# Patient Record
Sex: FEMALE | Race: BLACK OR AFRICAN AMERICAN | NOT HISPANIC OR LATINO | URBAN - METROPOLITAN AREA
[De-identification: names, ages, dates, MRNs, and addresses within clinical notes are randomized per-mention and may not be internally consistent; named-entity substitution may affect disease eponyms.]

---

## 2018-02-07 ENCOUNTER — OFFICE VISIT (OUTPATIENT)
Dept: RHEUMATOLOGY | Facility: CLINIC | Age: 78
End: 2018-02-07
Payer: MEDICARE

## 2018-02-07 ENCOUNTER — HOSPITAL ENCOUNTER (OUTPATIENT)
Dept: RADIOLOGY | Facility: HOSPITAL | Age: 78
Discharge: HOME | End: 2018-02-07
Attending: INTERNAL MEDICINE
Payer: MEDICARE

## 2018-02-07 VITALS
WEIGHT: 206.25 LBS | HEART RATE: 66 BPM | DIASTOLIC BLOOD PRESSURE: 76 MMHG | HEIGHT: 64 IN | SYSTOLIC BLOOD PRESSURE: 149 MMHG | BODY MASS INDEX: 35.21 KG/M2

## 2018-02-07 DIAGNOSIS — M19.90 OSTEOARTHRITIS, UNSPECIFIED OSTEOARTHRITIS TYPE, UNSPECIFIED SITE: ICD-10-CM

## 2018-02-07 DIAGNOSIS — B18.2 HEPATITIS C CARRIER: ICD-10-CM

## 2018-02-07 DIAGNOSIS — M79.672 FOOT PAIN, LEFT: ICD-10-CM

## 2018-02-07 DIAGNOSIS — M10.9 GOUTY ARTHRITIS: ICD-10-CM

## 2018-02-07 DIAGNOSIS — L40.50 PSA (PSORIATIC ARTHRITIS): Primary | ICD-10-CM

## 2018-02-07 DIAGNOSIS — L40.50 PSA (PSORIATIC ARTHRITIS): ICD-10-CM

## 2018-02-07 PROCEDURE — 96372 THER/PROPH/DIAG INJ SC/IM: CPT | Mod: PBBFAC,PO

## 2018-02-07 PROCEDURE — 1125F AMNT PAIN NOTED PAIN PRSNT: CPT | Mod: ,,, | Performed by: INTERNAL MEDICINE

## 2018-02-07 PROCEDURE — 73620 X-RAY EXAM OF FOOT: CPT | Mod: 26,59,LT, | Performed by: RADIOLOGY

## 2018-02-07 PROCEDURE — 99205 OFFICE O/P NEW HI 60 MIN: CPT | Mod: 25,S$PBB,, | Performed by: INTERNAL MEDICINE

## 2018-02-07 PROCEDURE — 72040 X-RAY EXAM NECK SPINE 2-3 VW: CPT | Mod: TC,FY,PO

## 2018-02-07 PROCEDURE — 99204 OFFICE O/P NEW MOD 45 MIN: CPT | Mod: PBBFAC,25,PO | Performed by: INTERNAL MEDICINE

## 2018-02-07 PROCEDURE — 1159F MED LIST DOCD IN RCRD: CPT | Mod: ,,, | Performed by: INTERNAL MEDICINE

## 2018-02-07 PROCEDURE — 73620 X-RAY EXAM OF FOOT: CPT | Mod: 26,RT,, | Performed by: RADIOLOGY

## 2018-02-07 PROCEDURE — 99999 PR PBB SHADOW E&M-NEW PATIENT-LVL IV: CPT | Mod: PBBFAC,,, | Performed by: INTERNAL MEDICINE

## 2018-02-07 PROCEDURE — 72040 X-RAY EXAM NECK SPINE 2-3 VW: CPT | Mod: 26,,, | Performed by: RADIOLOGY

## 2018-02-07 PROCEDURE — 73620 X-RAY EXAM OF FOOT: CPT | Mod: TC,50,FY,PO,LT

## 2018-02-07 RX ORDER — CLOPIDOGREL BISULFATE 75 MG/1
75 TABLET ORAL DAILY
COMMUNITY

## 2018-02-07 RX ORDER — FUROSEMIDE 20 MG/1
20 TABLET ORAL 2 TIMES DAILY
COMMUNITY

## 2018-02-07 RX ORDER — ACETAMINOPHEN AND CODEINE PHOSPHATE 300; 30 MG/1; MG/1
1 TABLET ORAL 3 TIMES DAILY PRN
Qty: 90 TABLET | Refills: 1 | Status: SHIPPED | OUTPATIENT
Start: 2018-02-07 | End: 2018-03-09

## 2018-02-07 RX ORDER — LEVOTHYROXINE SODIUM 25 UG/1
25 TABLET ORAL
COMMUNITY

## 2018-02-07 RX ORDER — ASPIRIN 325 MG
325 TABLET, DELAYED RELEASE (ENTERIC COATED) ORAL DAILY
COMMUNITY

## 2018-02-07 RX ORDER — PREDNISONE 2.5 MG/1
2.5 TABLET ORAL DAILY
Qty: 30 TABLET | Refills: 5 | Status: SHIPPED | OUTPATIENT
Start: 2018-02-07 | End: 2018-03-09

## 2018-02-07 RX ORDER — ATORVASTATIN CALCIUM 40 MG/1
40 TABLET, FILM COATED ORAL NIGHTLY
COMMUNITY

## 2018-02-07 RX ORDER — KETOROLAC TROMETHAMINE 30 MG/ML
30 INJECTION, SOLUTION INTRAMUSCULAR; INTRAVENOUS
Status: COMPLETED | OUTPATIENT
Start: 2018-02-07 | End: 2018-02-07

## 2018-02-07 RX ORDER — ALLOPURINOL 300 MG/1
300 TABLET ORAL DAILY
COMMUNITY

## 2018-02-07 RX ORDER — DULOXETIN HYDROCHLORIDE 30 MG/1
30 CAPSULE, DELAYED RELEASE ORAL DAILY
COMMUNITY
End: 2018-09-04 | Stop reason: SDUPTHER

## 2018-02-07 RX ORDER — METOPROLOL TARTRATE 25 MG/1
25 TABLET, FILM COATED ORAL 2 TIMES DAILY
COMMUNITY

## 2018-02-07 RX ORDER — GABAPENTIN 400 MG/1
400 CAPSULE ORAL NIGHTLY
COMMUNITY

## 2018-02-07 RX ORDER — CALCIUM CARBONATE 600 MG
600 TABLET ORAL ONCE
COMMUNITY

## 2018-02-07 RX ORDER — CALCITRIOL 0.5 UG/1
0.5 CAPSULE ORAL DAILY
COMMUNITY

## 2018-02-07 RX ORDER — METHYLPREDNISOLONE ACETATE 80 MG/ML
80 INJECTION, SUSPENSION INTRA-ARTICULAR; INTRALESIONAL; INTRAMUSCULAR; SOFT TISSUE
Status: COMPLETED | OUTPATIENT
Start: 2018-02-07 | End: 2018-02-07

## 2018-02-07 RX ADMIN — KETOROLAC TROMETHAMINE 30 MG: 30 INJECTION, SOLUTION INTRAMUSCULAR; INTRAVENOUS at 04:02

## 2018-02-07 RX ADMIN — METHYLPREDNISOLONE ACETATE 80 MG: 80 INJECTION, SUSPENSION INTRA-ARTICULAR; INTRALESIONAL; INTRAMUSCULAR; SOFT TISSUE at 04:02

## 2018-02-07 NOTE — PROGRESS NOTES
Subjective:       Patient ID: Priscilla Landry is a 77 y.o. female.    Chief Complaint: Consult    HPI pt was hospitalized and put in a nursing home x 2 years and had L toes amputated now at home with arthritis, curently on Humira with minimal relief        Disease Management HPI      Psoriatic Arthritis    The disease course has been worsening.     She complains of pain, stiffness, joint swelling and joint warmth. Onset of symptoms was 15 years ago.The symptoms have been worsening. Affected locations include the neck, right shoulder, right elbow, right wrist, right MCP, right PIP, right DIP, right hip, right toes, right foot, right ankle, right knee, left shoulder, left elbow, left wrist, left MCP, left PIP, left DIP, left hip, left knee, left ankle and left foot. Associated symptoms include fatigue, fever, pain at night, pain while resting, myalgias, dry eyes and stress. Pertinent negatives include no dysuria, Raynaud's syndrome, uveitis, trouble swallowing, pleurisy, headaches or jaw pain. She complains of morning stiffness lasting between 30 and 60 minutes after awakening.The neck, left shoulder, right shoulder, left elbow, right elbow, left wrist, right hand, left hand, right wrist, left hip, right hip, right knee, left ankle, right ankle and left knee presents with Arthralgia.    Past treatments include methotrexate, NSAIDs and corticosteroids (Humira, remicade). The treatment provided moderate relief. Factors aggravating her arthritis include activity. Past compliance problems include medical issues.    Review of Systems   Constitutional: Positive for fatigue and fever. Negative for activity change, appetite change, chills, diaphoresis and unexpected weight change.   HENT: Negative for congestion, dental problem, ear discharge, ear pain, facial swelling, mouth sores, nosebleeds, postnasal drip, rhinorrhea, sinus pressure, sneezing, sore throat, tinnitus, trouble swallowing and voice change.    Eyes: Negative  "for photophobia, pain, discharge, redness and itching.   Respiratory: Negative for apnea, cough, chest tightness, shortness of breath and wheezing.    Cardiovascular: Positive for leg swelling. Negative for chest pain and palpitations.   Gastrointestinal: Negative for abdominal distention, abdominal pain, constipation, diarrhea, nausea and vomiting.   Endocrine: Negative for cold intolerance, heat intolerance, polydipsia and polyuria.   Genitourinary: Negative for decreased urine volume, difficulty urinating, dysuria, flank pain, frequency, hematuria and urgency.   Musculoskeletal: Positive for arthralgias, back pain, gait problem, joint swelling, myalgias, neck pain, neck stiffness and stiffness.   Skin: Negative for pallor, rash and wound.   Allergic/Immunologic: Negative for immunocompromised state.   Neurological: Negative for dizziness, tremors, weakness, numbness and headaches.   Hematological: Negative for adenopathy. Does not bruise/bleed easily.   Psychiatric/Behavioral: Negative for sleep disturbance. The patient is not nervous/anxious.          Objective:   BP (!) 149/76   Pulse 66   Ht 5' 4" (1.626 m)   Wt 93.6 kg (206 lb 3.9 oz)   BMI 35.40 kg/m²      Physical Exam   Vitals reviewed.  Constitutional: She is oriented to person, place, and time and well-developed, well-nourished, and in no distress.   HENT:   Head: Normocephalic and atraumatic.   Mouth/Throat: Oropharynx is clear and moist.   Eyes: EOM are normal. Pupils are equal, round, and reactive to light.   Neck: Neck supple. No thyromegaly present.   Cardiovascular: Normal rate, regular rhythm and normal heart sounds.  Exam reveals no gallop and no friction rub.    No murmur heard.  Pulmonary/Chest: Breath sounds normal. She has no wheezes. She has no rales. She exhibits no tenderness.   Abdominal: There is no tenderness. There is no rebound and no guarding.       Right Side Rheumatological Exam     Examination finds the elbow, 2nd MCP, 3rd " MCP, 4th MCP and 5th MCP normal.    The patient is tender to palpation of the shoulder and knee    She has swelling of the knee, 2nd PIP, 3rd PIP, 4th PIP and 5th PIP    The patient has an enlarged wrist, 1st PIP, 2nd PIP, 3rd PIP, 4th PIP and 5th PIP    Shoulder Exam   Tenderness Location: acromion    Range of Motion   Active Abduction: abnormal   Adduction: abnormal  Sensation: normal    Knee Exam   Tenderness Location: medial joint line  Patellofemoral Crepitus: positive  Effusion: positive  Sensation: normal    Hip Exam   Tenderness Location: no tenderness  Sensation: normal    Elbow/Wrist Exam   Tenderness Location: no tenderness  Sensation: normal    Left Side Rheumatological Exam     Examination finds the elbow, knee, 1st MCP, 2nd MCP, 3rd MCP, 4th MCP and 5th MCP normal.    The patient is tender to palpation of the shoulder.    She has swelling of the 1st PIP, 2nd PIP, 3rd PIP, 4th PIP and 5th PIP    The patient has an enlarged wrist, 1st PIP, 2nd PIP, 3rd PIP, 4th PIP and 5th PIP.    Shoulder Exam   Tenderness Location: acromion    Range of Motion   Active Abduction: abnormal   Sensation: normal    Knee Exam   Tenderness Location: lateral joint line and medial joint line    Patellofemoral Crepitus: positive  Effusion: positive  Sensation: normal    Hip Exam   Tenderness Location: no tenderness  Sensation: normal    Elbow/Wrist Exam   Sensation: normal      Back/Neck Exam   General Inspection   Gait: normal       Tenderness Right paramedian tenderness of the Lower C-Spine and Lower L-Spine.Left paramedian tenderness of the Upper C-Spine and Lower L-Spine.      Lymphadenopathy:     She has no cervical adenopathy.   Neurological: She is alert and oriented to person, place, and time. She has normal sensation and normal strength. Gait normal.   Reflex Scores:       Patellar reflexes are 3+ on the right side and 3+ on the left side.  Skin: No rash noted. No erythema. No pallor.     Psoriasis B elbows, knees, back   feet   Psychiatric: Mood and affect normal.   Musculoskeletal: She exhibits edema, tenderness and deformity.           Urine, Microscopic (01/04/2018 11:53 AM)  Urine, Microscopic (01/04/2018 11:53 AM)   Component Value Ref Range   WBCs 0-5 0 - 5 /HPF   RBCs 6-10 (A) 0 - 2 /HPF   Bacteria Rare (A) None Seen /HPF   Squamous Epithelial Cells   (A) 0 - 20 /LPF   Mucus Rare (A) None Seen /LPF     Urine, Microscopic (01/04/2018 11:53 AM)   Specimen Performing Laboratory   Urine [facility]    [address] [place] [address]     Back to top of Lab Results      Urinalysis, Microscopic if Indicated (01/04/2018 11:53 AM)  Urinalysis, Microscopic if Indicated (01/04/2018 11:53 AM)   Component Value Ref Range   Color Yellow Yellow   Clarity/Appearance Clear Clear   Specific Gravity 1.018 1.005 - 1.030   pH 5.0 5.0 - 8.0   Glucose, UA Normal Normal mg/dL   Protein 30 mg/dL (A) Negative   Ketones Negative Negative   Bilirubin, Urine Negative Negative   Urobilinogen, UA Normal Normal   Nitrites Negative Negative   Blood Negative Negative   Leukocyte Esterase, UA Negative Negative     Urinalysis, Microscopic if Indicated (01/04/2018 11:53 AM)   Specimen Performing Laboratory   Urine [facility]    [address] [place] [id number]     Back to top of Lab Results      Phosphorus (01/04/2018 11:48 AM)  Phosphorus (01/04/2018 11:48 AM)   Component Value Ref Range   Phosphorus 3.9 2.5 - 4.7 mg/dL     Phosphorus (01/04/2018 11:48 AM)   Specimen Performing Laboratory   Blood [facility]    [address] [place] [address]     Back to top of Lab Results      CBC with Differential (01/04/2018 11:48 AM)  CBC with Differential (01/04/2018 11:48 AM)   Component Value Ref Range   WBC 6.2 4.5 - 11.0 10   RBC 3.79 (L) 4.00 - 5.20 10   Hemoglobin 11.8 (L) 12.0 - 16.0 g/dL   Hematocrit 36.5 35.0 - 46.0 %   MCV 96.3 80.0 - 100.0 fL   MCH 31.1 26.0 - 34.0 pg   MCHC 32.3 31.0 - 37.0 g/dL   RDW 13.9 11.5 -  14.5 %   Platelet Count 154 130 - 400 10   MPV 9.3 7.4 - 10.4 fL   Neutrophils Absolute - Instrument 3.6 1.8 - 8.0 10   Lymphocytes Absolute - Instrument 1.9 1.1 - 5.0 10   Monocytes Absolute - Instrument 0.4 0.2 - 1.1 10   Eosinophils Absolute - Instrument 0.3 0.0 - 0.6 10   Basophils Absolute - Instrument 0.1 0.0 - 0.2 10   Neutrophils Percent - Instrument 58.1 40 - 75 %   Lymphocytes Percent - Instrument 30.0 18 - 46 %   Monocytes Percent - Instrument 6.9 2 - 10 %   Eosinophils Percent - Instrument 4.1 0 - 6 %   Basophils Percent - Instrument 0.9 0 - 2 %     CBC with Differential (01/04/2018 11:48 AM)   Specimen Performing Laboratory   Blood [facility]    [address] [place] [address]     Back to top of Lab Results      Hepatitis C RNA Quantitative PCR (01/04/2018 11:48 AM)  Hepatitis C RNA Quantitative PCR (01/04/2018 11:48 AM)   Component Value Ref Range   Hepatitis C RNA By PCR Comment: HCV RNA Not Detected. IU/mL     Hepatitis C RNA Quantitative PCR (01/04/2018 11:48 AM)   Specimen Performing Laboratory   Blood [facility]    [address] [place] [address]       Hepatitis C RNA Quantitative PCR (01/04/2018 11:48 AM)   Narrative       Roche Ning Ampliprep/Taqman HCV Test, v2.0     Back to top of Lab Results      Hemoglobin A1c (01/04/2018 11:48 AM)  Hemoglobin A1c (01/04/2018 11:48 AM)   Component Value Ref Range   % Hemoglobin A1c 6.1 (H) 4.7 - 5.6 %   Estimated Average Glucose 128 (H) <115 mg/dL     Hemoglobin A1c (01/04/2018 11:48 AM)   Specimen Performing Laboratory   Blood [facility]    [address] [address] [address]       Hemoglobin A1c (01/04/2018 11:48 AM)   Narrative   Hemoglobin A1c Interpretative Guide    Hemoglobin A1c Result Interpretation    4.7%-5.6%Normal Reference Range    5.7%-6.4%Increased Risk for Diabetes    >6.4%     Diagnostic of Diabetes    <7.0%     Adult Glycemic Control Target         Back to top of Lab Results      CBC with Differential  (01/04/2018 11:48 AM)  CBC with Differential (01/04/2018 11:48 AM)   Specimen Performing Laboratory   Blood        CBC with Differential (01/04/2018 11:48 AM)   Narrative   The following orders were created for panel order CBC with Differential.    Procedure                               Abnormality         Status                       ---------                               -----------         ------                       CBC with Differential[[id number]]         Abnormal            Final result                     Please view results for these tests on the individual orders.     Back to top of Lab Results      TSH with Reflex (01/04/2018 11:48 AM)  TSH with Reflex (01/04/2018 11:48 AM)   Component Value Ref Range   TSH 4.09 0.50 - 5.00 uIU/mL     TSH with Reflex (01/04/2018 11:48 AM)   Specimen Performing Laboratory   Blood [facility]    [address] [address] [address]     Back to top of Lab Results      Lipid Panel (01/04/2018 11:48 AM)  Lipid Panel (01/04/2018 11:48 AM)   Component Value Ref Range   Cholesterol 167 <200 mg/dL   HDL Cholesterol 50 40 - 59 mg/dL   LDL Cholesterol Calculated 96 <130 mg/dL   Triglycerides 104 <150 mg/dL   Non-HDL Calculated 117     Chol/HDL Ratio 3.34 0.00 - 4.40     Lipid Panel (01/04/2018 11:48 AM)   Specimen Performing Laboratory   Blood [facility]    [address] [address] [address]       Lipid Panel (01/04/2018 11:48 AM)   Narrative   CHOL/HDL RATIO    CHD RISK:        Male     Female        Average Risk     5.0      4.4    2x Average Risk  9.6      7.1    3x Average Risk  23.4     11.0         Back to top of Lab Results      Comprehensive Metabolic Panel (01/04/2018 11:48 AM)  Comprehensive Metabolic Panel (01/04/2018 11:48 AM)   Component Value Ref Range   Sodium 141 135 - 146 mmol/L   Potassium 4.4 3.6 - 5.2 mmol/L   Chloride 102 96 - 110 mmol/L   Carbon Dioxide 30 24 - 32 mmol/L   Glucose 102 (H) 65 - 99 mg/dL   BUN 32 (H) 7 - 25 mg/dL   Creatinine  1.52 (H) 0.50 - 1.10 mg/dL   Calcium 10.1 8.4 - 10.3 mg/dL   Total Protein 9.3 (H) 6.0 - 8.0 g/dL   Albumin 4.6 3.4 - 5.0 g/dL   AST 40 <45 U/L   ALT 28 <46 U/L   Alkaline Phosphatase 74 20 - 120 U/L   Bilirubin, Total 0.8 <1.3 mg/dL   EGFR,  38 (L) >89 mL/min   EGFR, Non  32 (L) >89 mL/min     Comprehensive Metabolic Panel (2018 11:48 AM)   Specimen Performing Laboratory   Blood [facility]    [address] [address] [address]     Back to top of Lab Results  Component Name Value Ref Range   Total Bilirubin 0.5 0.0 - 1.2 mg/dL   ALT (SGPT) P5P 30 0 - 40 IU/L    Haptoglobin 134 34 - 200 mg/dL   GGT 20 0 - 60 IU/L    Apolipoprotein A-1 136 116 - 209 mg/dL   Alpha 2-Macroglobulins, Qn 277 (H) 110 - 276 mg/dL   Fibrosis Scorin.45 (H) 0.00 - 0.21    Necroinflammat Activity Score 0.18 (H) 0.00 - 0.17    Limitations: Comment   Comment:  (NOTE)  The negative predictive value of a Fibrotest score <0.31 (absence of  clinically significant fibrosis) was 85% when compared to liver  biopsy in 1,270 HCV infected patients with a 38% prevalence of  significant liver fibrosis (F2, 3 or 4). The positive predictive  value of a Fibro-test score >0.48 (F2, 3, 4) was 61% in that same  patient cohort. HCV FibroSURE is not recommended in patients with  Gilbert Disease, acute hemolysis (e.g. HCV ribavirin therapy mediated  hemolysis) acute hepa-titis of the liver, extra-hepatic cholestasis,  transplant patients, and/or renal insufficiency patients.  Any of  these clinical situations may lead to inaccurate quantitative  predictions of fibrosis and necroinflammatory activity in the liver.     Interpretations: Comment   Comment:  (NOTE)  Quantitative results of 6 biochemical tests are analyzed using  a computational algorithm to provide a quantitative surrogate  marker (0.0-1.0) for liver fibrosis (METAVIR F0-F4) and for  necroinflammatory activity (METAVIR A0-A3).     Fibrosis Scoring:  Comment   Comment:  (NOTE)       <0.21 = Stage F0 - No fibrosis  0.21 - 0.27 = Stage F0 - F1  0.27 - 0.31 = Stage F1 - Portal fibrosis  0.31 - 0.48 = Stage F1 - F2  0.48 - 0.58 = Stage F2 - Bridging fibrosis with few septa  0.58 - 0.72 = Stage F3 - Bridging fibrosis with many septa  0.72 - 0.74 = Stage F3 - F4       >0.74 = Stage F4 - Cirrhosis     Necroinflamm Activity Scoring: Comment   Comment:  (NOTE)       <0.17 = Grade A0 - No Activity  0.17 - 0.29 = Grade A0 - A1  0.29 - 0.36 = Grade A1 - Minimal activity  0.36 - 0.52 = Grade A1 - A2  0.52 - 0.60 = Grade A2 - Moderate activity  0.60 - 0.62 = Grade A2 - A3       >0.62 = Grade A3 - Severe activity     Fibrosis Stage F1*F2     Necroinflammat Activity Grade A0*A1   Comment: PERFORMED AT: [address] [address], PH: [phone], DIRECTOR: WANDY CARROLL MD     Specimen   Blood     Component Name Value Ref Range   Hepatitis C RNA By PCR     Comment: HCV RNA Not Detected. IU/mL    Specimen   Blood       QuantiFERON In Tube (11/06/2017 1:45 PM)  QuantiFERON In Tube (11/06/2017 1:45 PM)   Component Value Ref Range   QuantiFERON TB Gold - [facility] Negative Negative   QuantiFERON Criteria - [facility] Comment  Comment:   To be considered positive a specimen should have a TB Ag minus Nil  value greater than or equal to 0.35 IU/mL and in addition the TB Ag  minus Nil value must be greater than or equal to 25% of the Nil  value. There may be insufficient information in these values to  differentiate between some negative and some indeterminate test  values.     QuantiFERON TB Ag Value - [facility] 0.11 IU/mL   QuantiFERON Nil Value - [facility] 0.11 IU/mL   QuantiFERON Mitogen Value - [facility] 5.03 IU/mL   QFT TB Ag minus Nil Value - [facility] 0.00 IU/mL   Interpretation: - [facility] Comment  Comment:   The QuantiFERON TB Gold (in Tube) assay is intended for use as an aid  in the diagnosis of TB infection. Negative results suggest that  there  is no TB infection. In patients with high suspicion of exposure, a  negative test should be repeated. A positive test indicates infection  with Mycobacterium tuberculosis. Among individuals without  tuberculosis infection, a positive test may be due to exposure to  M. kansasii, M. szulgai or M. marinum. On the Internet, go to  cdc.gov/tb for further details.       QuantiFERON In Tube (11/06/2017 1:45 PM)   Specimen Performing Laboratory   Blood [facility]       QuantiFERON In Tube (11/06/2017 1:45 PM)   Narrative   Performed at:  [address] [place] [address]       his encounter    Assessment:       1. PSA (psoriatic arthritis)    2. Osteoarthritis, unspecified osteoarthritis type, unspecified site    3. Hepatitis C carrier    4. Foot pain, left    5. Gouty arthritis            Plan:        Priscilla was seen today for consult.    Diagnoses and all orders for this visit:    PSA (psoriatic arthritis)  Comments:  consider enbrel, or stelara  Orders:  -     Hepatitis C antibody; Future  -     Hepatitis C RNA, quantitative, PCR; Future  -     Hepatitis C Viral RNA Genotype, LIPA; Future  -     CBC auto differential; Future  -     Comprehensive metabolic panel; Future  -     Sedimentation rate, manual; Future  -     C-reactive protein; Future  -     predniSONE (DELTASONE) 2.5 MG tablet; Take 1 tablet (2.5 mg total) by mouth once daily.  -     methylPREDNISolone acetate injection 80 mg; Inject 1 mL (80 mg total) into the muscle one time.  -     ketorolac injection 30 mg; Inject 30 mg into the muscle one time.  -     X-Ray Cervical Spine AP And Lateral; Future  -     Hepatitis C antibody  -     HEPATIC FUNCTION PANEL; Future    Osteoarthritis, unspecified osteoarthritis type, unspecified site  -     Hepatitis C antibody; Future  -     Hepatitis C RNA, quantitative, PCR; Future  -     Hepatitis C Viral RNA Genotype, LIPA; Future  -     CBC auto differential; Future  -     Comprehensive metabolic panel; Future  -     Sedimentation  rate, manual; Future  -     C-reactive protein; Future  -     predniSONE (DELTASONE) 2.5 MG tablet; Take 1 tablet (2.5 mg total) by mouth once daily.  -     methylPREDNISolone acetate injection 80 mg; Inject 1 mL (80 mg total) into the muscle one time.  -     ketorolac injection 30 mg; Inject 30 mg into the muscle one time.  -     X-Ray Cervical Spine AP And Lateral; Future  -     Hepatitis C antibody  -     HEPATIC FUNCTION PANEL; Future    Hepatitis C carrier  -     Hepatitis C antibody; Future  -     Hepatitis C RNA, quantitative, PCR; Future  -     Hepatitis C Viral RNA Genotype, LIPA; Future  -     CBC auto differential; Future  -     Comprehensive metabolic panel; Future  -     Sedimentation rate, manual; Future  -     C-reactive protein; Future  -     predniSONE (DELTASONE) 2.5 MG tablet; Take 1 tablet (2.5 mg total) by mouth once daily.  -     methylPREDNISolone acetate injection 80 mg; Inject 1 mL (80 mg total) into the muscle one time.  -     ketorolac injection 30 mg; Inject 30 mg into the muscle one time.  -     X-Ray Cervical Spine AP And Lateral; Future  -     Hepatitis C antibody  -     HEPATIC FUNCTION PANEL; Future    Foot pain, left  -     Hepatitis C antibody; Future  -     Hepatitis C RNA, quantitative, PCR; Future  -     Hepatitis C Viral RNA Genotype, LIPA; Future  -     CBC auto differential; Future  -     Comprehensive metabolic panel; Future  -     Sedimentation rate, manual; Future  -     C-reactive protein; Future  -     predniSONE (DELTASONE) 2.5 MG tablet; Take 1 tablet (2.5 mg total) by mouth once daily.  -     methylPREDNISolone acetate injection 80 mg; Inject 1 mL (80 mg total) into the muscle one time.  -     ketorolac injection 30 mg; Inject 30 mg into the muscle one time.  -     X-Ray Cervical Spine AP And Lateral; Future  -     Hepatitis C antibody  -     X-Ray Foot AP Bilateral; Future  -     HEPATIC FUNCTION PANEL; Future    Gouty arthritis  -     HEPATIC FUNCTION PANEL;  Future    Other orders  -     acetaminophen-codeine 300-30mg (TYLENOL #3) 300-30 mg Tab; Take 1 tablet by mouth 3 (three) times daily as needed.

## 2018-02-08 NOTE — PROGRESS NOTES
Administered 1 cc DepoMedrol 80mg/cc to left ventrogluteal. Pt tolerated well. No acute reaction noted to site. Pt instructed on S/S to report. Advised patient to wait in lobby 15 minutes after receiving injection to monitor for any reactions..  Pt verbalized understanding.     Lot: [id number]  Exp: 01/2019  Administered 1 cc  Toradol 30mg/cc  to right ventrogluteal. Pt tolerated well. No acute reaction noted to site. Pt instructed on S/S to report. Advised patient to wait in lobby 15 minutes after receiving injection to monitor for any reactions. Pt verbalized understanding.     Lot: [id number]  Exp: [id number]

## 2018-04-16 ENCOUNTER — OFFICE VISIT (OUTPATIENT)
Dept: RHEUMATOLOGY | Facility: CLINIC | Age: 78
End: 2018-04-16
Payer: MEDICARE

## 2018-04-16 VITALS
BODY MASS INDEX: 35.93 KG/M2 | HEART RATE: 57 BPM | HEIGHT: 64 IN | DIASTOLIC BLOOD PRESSURE: 57 MMHG | WEIGHT: 210.44 LBS | SYSTOLIC BLOOD PRESSURE: 117 MMHG

## 2018-04-16 DIAGNOSIS — M10.9 GOUTY ARTHRITIS: ICD-10-CM

## 2018-04-16 DIAGNOSIS — L40.50 PSA (PSORIATIC ARTHRITIS): Primary | ICD-10-CM

## 2018-04-16 DIAGNOSIS — B18.2 HEPATITIS C CARRIER: ICD-10-CM

## 2018-04-16 DIAGNOSIS — M79.672 FOOT PAIN, LEFT: ICD-10-CM

## 2018-04-16 DIAGNOSIS — M19.90 OSTEOARTHRITIS, UNSPECIFIED OSTEOARTHRITIS TYPE, UNSPECIFIED SITE: ICD-10-CM

## 2018-04-16 PROCEDURE — 99999 PR PBB SHADOW E&M-EST. PATIENT-LVL III: CPT | Mod: PBBFAC,,, | Performed by: INTERNAL MEDICINE

## 2018-04-16 PROCEDURE — 96372 THER/PROPH/DIAG INJ SC/IM: CPT | Mod: PBBFAC,PO

## 2018-04-16 PROCEDURE — 99213 OFFICE O/P EST LOW 20 MIN: CPT | Mod: PBBFAC,PO,25 | Performed by: INTERNAL MEDICINE

## 2018-04-16 PROCEDURE — 99214 OFFICE O/P EST MOD 30 MIN: CPT | Mod: S$PBB,,, | Performed by: INTERNAL MEDICINE

## 2018-04-16 RX ORDER — TRAMADOL HYDROCHLORIDE 50 MG/1
50 TABLET ORAL EVERY 8 HOURS PRN
Qty: 90 TABLET | Refills: 4 | Status: SHIPPED | OUTPATIENT
Start: 2018-04-16 | End: 2018-11-05 | Stop reason: SDUPTHER

## 2018-04-16 RX ORDER — METHYLPREDNISOLONE ACETATE 80 MG/ML
80 INJECTION, SUSPENSION INTRA-ARTICULAR; INTRALESIONAL; INTRAMUSCULAR; SOFT TISSUE
Status: COMPLETED | OUTPATIENT
Start: 2018-04-16 | End: 2018-04-16

## 2018-04-16 RX ORDER — LANCETS 28 GAUGE
EACH MISCELLANEOUS
COMMUNITY
Start: 2017-05-04

## 2018-04-16 RX ORDER — LORATADINE 10 MG/1
TABLET ORAL
COMMUNITY
Start: 2015-02-26

## 2018-04-16 RX ORDER — INSULIN GLARGINE 100 [IU]/ML
90 INJECTION, SOLUTION SUBCUTANEOUS
COMMUNITY
Start: 2015-03-06

## 2018-04-16 RX ADMIN — METHYLPREDNISOLONE ACETATE 80 MG: 80 INJECTION, SUSPENSION INTRA-ARTICULAR; INTRALESIONAL; INTRAMUSCULAR; SOFT TISSUE at 11:04

## 2018-04-16 NOTE — PROGRESS NOTES
Subjective:       Patient ID: Priscilla Landry is a 77 y.o. female.    Chief Complaint: Disease Management (2 mth f/u for PSA, xrays and labs review)    Follow up : on Humira with psa, saw Dr Burden and the heart doc. Overall stable, Patient complains of arthralgias and myalgias for which has been present for a few years. Pain is located in multiple joints, both shoulder(s), both elbow(s), both wrist(s), both MCP(s): 1st, 2nd, 3rd, 4th and 5th, both PIP(s): 1st, 2nd, 3rd, 4th and 5th, both DIP(s): 1st and 2nd, both hip(s), both knee(s) and both MTP(s): 1st, 2nd, 3rd, 4th and 5th, is described as aching, pulsating, shooting and throbbing, and is constant, moderate .  Associated symptoms include: crepitation, decreased range of motion, edema, effusion, tenderness and warmth. She is on lasix 20 mg twice a day but has been on it and has diarrhea once a week.       Review of Systems   Constitutional: Negative for activity change, appetite change, chills, diaphoresis and unexpected weight change.   HENT: Negative for congestion, dental problem, ear discharge, ear pain, facial swelling, mouth sores, nosebleeds, postnasal drip, rhinorrhea, sinus pressure, sneezing, sore throat, tinnitus and voice change.    Eyes: Negative for photophobia, pain, discharge, redness and itching.   Respiratory: Negative for apnea, cough, chest tightness, shortness of breath and wheezing.    Cardiovascular: Positive for leg swelling. Negative for chest pain and palpitations.   Gastrointestinal: Negative for abdominal distention, abdominal pain, constipation, diarrhea, nausea and vomiting.   Endocrine: Negative for cold intolerance, heat intolerance, polydipsia and polyuria.   Genitourinary: Negative for decreased urine volume, difficulty urinating, flank pain, frequency, hematuria and urgency.   Musculoskeletal: Positive for arthralgias, back pain, gait problem, neck pain and neck stiffness.   Skin: Negative for pallor, rash and wound.  "  Allergic/Immunologic: Negative for immunocompromised state.   Neurological: Negative for dizziness, tremors, weakness and numbness.   Hematological: Negative for adenopathy. Does not bruise/bleed easily.   Psychiatric/Behavioral: Negative for sleep disturbance. The patient is not nervous/anxious.          Objective:   BP (!) 117/57   Pulse (!) 57   Ht 5' 4" (1.626 m)   Wt 95.5 kg (210 lb 6.9 oz)   BMI 36.12 kg/m²      Physical Exam   Vitals reviewed.  Constitutional: She is oriented to person, place, and time and well-developed, well-nourished, and in no distress.   HENT:   Head: Normocephalic and atraumatic.   Mouth/Throat: Oropharynx is clear and moist.   Eyes: EOM are normal. Pupils are equal, round, and reactive to light.   Neck: Neck supple. No thyromegaly present.   Cardiovascular: Normal rate, regular rhythm and normal heart sounds.  Exam reveals no gallop and no friction rub.    No murmur heard.  Pulmonary/Chest: Breath sounds normal. She has no wheezes. She has no rales. She exhibits no tenderness.   Abdominal: There is no tenderness. There is no rebound and no guarding.       Right Side Rheumatological Exam     Examination finds the elbow, 2nd MCP, 3rd MCP, 4th MCP and 5th MCP normal.    The patient is tender to palpation of the shoulder and knee    She has swelling of the knee, 2nd PIP, 3rd PIP, 4th PIP and 5th PIP    The patient has an enlarged wrist, 1st PIP, 2nd PIP, 3rd PIP, 4th PIP and 5th PIP    Shoulder Exam   Tenderness Location: acromion    Range of Motion   Active Abduction: abnormal   Adduction: abnormal  Sensation: normal    Knee Exam   Tenderness Location: medial joint line  Patellofemoral Crepitus: positive  Effusion: positive  Sensation: normal    Hip Exam   Tenderness Location: no tenderness  Sensation: normal    Elbow/Wrist Exam   Tenderness Location: no tenderness  Sensation: normal    Left Side Rheumatological Exam     Examination finds the elbow, knee, 1st MCP, 2nd MCP, 3rd MCP, " 4th MCP and 5th MCP normal.    The patient is tender to palpation of the shoulder.    She has swelling of the 1st PIP, 2nd PIP, 3rd PIP, 4th PIP and 5th PIP    The patient has an enlarged wrist, 1st PIP, 2nd PIP, 3rd PIP, 4th PIP and 5th PIP.    Shoulder Exam   Tenderness Location: acromion    Range of Motion   Active Abduction: abnormal   Sensation: normal    Knee Exam   Tenderness Location: lateral joint line and medial joint line    Patellofemoral Crepitus: positive  Effusion: positive  Sensation: normal    Hip Exam   Tenderness Location: no tenderness  Sensation: normal    Elbow/Wrist Exam   Sensation: normal      Back/Neck Exam   General Inspection   Gait: normal       Tenderness Right paramedian tenderness of the Lower C-Spine and Lower L-Spine.Left paramedian tenderness of the Upper C-Spine and Lower L-Spine.      Lymphadenopathy:     She has no cervical adenopathy.   Neurological: She is alert and oriented to person, place, and time. She has normal sensation and normal strength. Gait normal.   Reflex Scores:       Patellar reflexes are 3+ on the right side and 3+ on the left side.  Skin: No rash noted. No erythema. No pallor.     Psoriasis B elbows, knees, back  feet   Psychiatric: Mood and affect normal.   Musculoskeletal: She exhibits tenderness and deformity. She exhibits no edema.        Results for orders placed or performed in visit on 02/07/18   Hepatitis C RNA, quantitative, PCR   Result Value Ref Range    HCV Log <1.08 <1.08 Log (10) IU/mL    HCV, Qualitative Not detected Not detected IU/mL    HCV RNA Quant PCR <12 <12 IU/mL   Hepatitis C Viral RNA Genotype, LIPA   Result Value Ref Range    Hepatitis C Viral RNA Genotype, LIPA See result image under hyperlink Not detected   CBC auto differential   Result Value Ref Range    WBC 7.80 3.90 - 12.70 K/uL    RBC 3.88 (L) 4.00 - 5.40 M/uL    Hemoglobin 12.1 12.0 - 16.0 g/dL    Hematocrit 38.0 37.0 - 48.5 %    MCV 98 82 - 98 fL    MCH 31.2 (H) 27.0 - 31.0  pg    MCHC 31.8 (L) 32.0 - 36.0 g/dL    RDW 12.6 11.5 - 14.5 %    Platelets 155 150 - 350 K/uL    MPV 11.7 9.2 - 12.9 fL    Gran # (ANC) 4.4 1.8 - 7.7 K/uL    Lymph # 2.5 1.0 - 4.8 K/uL    Mono # 0.6 0.3 - 1.0 K/uL    Eos # 0.3 0.0 - 0.5 K/uL    Baso # 0.02 0.00 - 0.20 K/uL    Gran% 56.5 38.0 - 73.0 %    Lymph% 32.2 18.0 - 48.0 %    Mono% 7.4 4.0 - 15.0 %    Eosinophil% 3.6 0.0 - 8.0 %    Basophil% 0.3 0.0 - 1.9 %    Differential Method Automated    Comprehensive metabolic panel   Result Value Ref Range    Sodium 141 136 - 145 mmol/L    Potassium 4.6 3.5 - 5.1 mmol/L    Chloride 107 95 - 110 mmol/L    CO2 23 23 - 29 mmol/L    Glucose 60 (L) 70 - 110 mg/dL    BUN, Bld 41 (H) 8 - 23 mg/dL    Creatinine 1.9 (H) 0.5 - 1.4 mg/dL    Calcium 10.0 8.7 - 10.5 mg/dL    Total Protein 9.1 (H) 6.0 - 8.4 g/dL    Albumin 3.8 3.5 - 5.2 g/dL    Total Bilirubin 0.7 0.1 - 1.0 mg/dL    Alkaline Phosphatase 84 55 - 135 U/L    AST 36 10 - 40 U/L    ALT 22 10 - 44 U/L    Anion Gap 11 8 - 16 mmol/L    eGFR if African American 28.7 (A) >60 mL/min/1.73 m^2    eGFR if non African American 24.9 (A) >60 mL/min/1.73 m^2   Sedimentation rate, manual   Result Value Ref Range    Sed Rate 70 (H) 0 - 20 mm/Hr   C-reactive protein   Result Value Ref Range    CRP 3.1 0.0 - 8.2 mg/L   HEPATIC FUNCTION PANEL   Result Value Ref Range    Total Protein 9.1 (H) 6.0 - 8.4 g/dL    Albumin 3.8 3.5 - 5.2 g/dL    Total Bilirubin 0.7 0.1 - 1.0 mg/dL    Bilirubin, Direct 0.3 0.1 - 0.3 mg/dL    AST 36 10 - 40 U/L    ALT 22 10 - 44 U/L    Alkaline Phosphatase 84 55 - 135 U/L         Assessment:       1. PSA (psoriatic arthritis)    2. Osteoarthritis, unspecified osteoarthritis type, unspecified site    3. Hepatitis C carrier    4. Foot pain, left    5. Gouty arthritis            Plan:        Priscilla was seen today for disease management.    Diagnoses and all orders for this visit:    PSA (psoriatic arthritis)  -     Comprehensive metabolic panel; Future  -      Sedimentation rate, manual; Future  -     C-reactive protein; Future  -     Comprehensive metabolic panel  -     Sedimentation rate, manual  -     C-reactive protein  -     adalimumab (HUMIRA) PnKt injection; Inject 0.8 mLs (40 mg total) into the skin every 14 (fourteen) days.  -     methylPREDNISolone acetate injection 80 mg; Inject 1 mL (80 mg total) into the muscle one time.    Osteoarthritis, unspecified osteoarthritis type, unspecified site  -     Comprehensive metabolic panel; Future  -     Sedimentation rate, manual; Future  -     C-reactive protein; Future  -     Comprehensive metabolic panel  -     Sedimentation rate, manual  -     C-reactive protein  -     adalimumab (HUMIRA) PnKt injection; Inject 0.8 mLs (40 mg total) into the skin every 14 (fourteen) days.  -     methylPREDNISolone acetate injection 80 mg; Inject 1 mL (80 mg total) into the muscle one time.    Hepatitis C carrier  -     Comprehensive metabolic panel; Future  -     Sedimentation rate, manual; Future  -     C-reactive protein; Future  -     Comprehensive metabolic panel  -     Sedimentation rate, manual  -     C-reactive protein  -     adalimumab (HUMIRA) PnKt injection; Inject 0.8 mLs (40 mg total) into the skin every 14 (fourteen) days.  -     methylPREDNISolone acetate injection 80 mg; Inject 1 mL (80 mg total) into the muscle one time.    Foot pain, left  -     Comprehensive metabolic panel; Future  -     Sedimentation rate, manual; Future  -     C-reactive protein; Future  -     Comprehensive metabolic panel  -     Sedimentation rate, manual  -     C-reactive protein  -     adalimumab (HUMIRA) PnKt injection; Inject 0.8 mLs (40 mg total) into the skin every 14 (fourteen) days.  -     methylPREDNISolone acetate injection 80 mg; Inject 1 mL (80 mg total) into the muscle one time.    Gouty arthritis  -     Comprehensive metabolic panel; Future  -     Sedimentation rate, manual; Future  -     C-reactive protein; Future  -      Comprehensive metabolic panel  -     Sedimentation rate, manual  -     C-reactive protein  -     adalimumab (HUMIRA) PnKt injection; Inject 0.8 mLs (40 mg total) into the skin every 14 (fourteen) days.  -     methylPREDNISolone acetate injection 80 mg; Inject 1 mL (80 mg total) into the muscle one time.  -     Uric acid; Future  -     Uric acid    Other orders  -     traMADol (ULTRAM) 50 mg tablet; Take 1 tablet (50 mg total) by mouth every 8 (eight) hours as needed for Pain.     no change added tramadal refill humira, we called the pharmacy and made sure she was not taking mobic which she is not. She has weekly diarrhea and we are not sure why but no new meds were added

## 2018-04-16 NOTE — PROGRESS NOTES
Administered 1 cc ( 80 mg/ml ) of depomedrol to the left upper outer gluteal. Informed of s/s to report verbalized understanding. No adverse reactions noted.    Lot # [id number]  Expiration 05/19

## 2018-04-19 ENCOUNTER — TELEPHONE (OUTPATIENT)
Dept: RHEUMATOLOGY | Facility: CLINIC | Age: 78
End: 2018-04-19

## 2018-04-19 DIAGNOSIS — M77.50 BONE SPUR OF FOOT: Primary | ICD-10-CM

## 2018-04-19 NOTE — TELEPHONE ENCOUNTER
----- Message from Ralf Farias MD sent at 4/17/2018  5:08 PM CDT -----  Spurs in her feet refer to podiatry if not done already

## 2018-04-19 NOTE — TELEPHONE ENCOUNTER
Spoke to pt, advised of results. Pt does not see a podiatrist and would like referral to our facility. Pt also advises she requires transportation and would like to try and coordinate all her appts. Referral placed.

## 2018-06-06 ENCOUNTER — TELEPHONE (OUTPATIENT)
Dept: RHEUMATOLOGY | Facility: CLINIC | Age: 78
End: 2018-06-06

## 2018-06-06 NOTE — TELEPHONE ENCOUNTER
Contacted patient regarding lab results. Informed Dr Fraias has reviewed and advised for patient to see nephrology. Patient currently sees one at [facility] and has upcoming appointment next month. Nurse encouraged to keep appointment and avoid ibuprofen, aleve or Advil until appointment with nephrology. Patient stated only take medication that is prescribed and verbalized understanding on which medications to avoid.

## 2018-09-04 ENCOUNTER — OFFICE VISIT (OUTPATIENT)
Dept: PODIATRY | Facility: CLINIC | Age: 78
End: 2018-09-04
Payer: MEDICARE

## 2018-09-04 ENCOUNTER — OFFICE VISIT (OUTPATIENT)
Dept: RHEUMATOLOGY | Facility: CLINIC | Age: 78
End: 2018-09-04
Payer: MEDICARE

## 2018-09-04 VITALS
WEIGHT: 210.88 LBS | HEART RATE: 67 BPM | DIASTOLIC BLOOD PRESSURE: 58 MMHG | HEIGHT: 64 IN | BODY MASS INDEX: 36 KG/M2 | SYSTOLIC BLOOD PRESSURE: 115 MMHG

## 2018-09-04 VITALS — WEIGHT: 210 LBS | BODY MASS INDEX: 35.85 KG/M2 | HEIGHT: 64 IN

## 2018-09-04 DIAGNOSIS — Z89.432 HISTORY OF TRANSMETATARSAL AMPUTATION OF LEFT FOOT: ICD-10-CM

## 2018-09-04 DIAGNOSIS — M25.531 WRIST PAIN, ACUTE, RIGHT: ICD-10-CM

## 2018-09-04 DIAGNOSIS — B18.2 HEPATITIS C CARRIER: ICD-10-CM

## 2018-09-04 DIAGNOSIS — G89.29 CHRONIC RIGHT SHOULDER PAIN: ICD-10-CM

## 2018-09-04 DIAGNOSIS — L84 CORN OR CALLUS: ICD-10-CM

## 2018-09-04 DIAGNOSIS — E11.42 DIABETIC POLYNEUROPATHY ASSOCIATED WITH TYPE 2 DIABETES MELLITUS: Primary | ICD-10-CM

## 2018-09-04 DIAGNOSIS — M10.9 GOUTY ARTHRITIS: ICD-10-CM

## 2018-09-04 DIAGNOSIS — M19.90 OSTEOARTHRITIS, UNSPECIFIED OSTEOARTHRITIS TYPE, UNSPECIFIED SITE: ICD-10-CM

## 2018-09-04 DIAGNOSIS — M25.511 CHRONIC RIGHT SHOULDER PAIN: ICD-10-CM

## 2018-09-04 DIAGNOSIS — M20.41 HAMMER TOE OF RIGHT FOOT: ICD-10-CM

## 2018-09-04 DIAGNOSIS — B35.1 ONYCHOMYCOSIS DUE TO DERMATOPHYTE: ICD-10-CM

## 2018-09-04 DIAGNOSIS — I73.9 PAD (PERIPHERAL ARTERY DISEASE): ICD-10-CM

## 2018-09-04 DIAGNOSIS — R94.6 ABNORMAL RESULTS OF THYROID FUNCTION STUDIES: ICD-10-CM

## 2018-09-04 DIAGNOSIS — L40.50 PSA (PSORIATIC ARTHRITIS): Primary | ICD-10-CM

## 2018-09-04 PROCEDURE — 11055 PARING/CUTG B9 HYPRKER LES 1: CPT | Mod: PBBFAC,PN | Performed by: PODIATRIST

## 2018-09-04 PROCEDURE — 20610 DRAIN/INJ JOINT/BURSA W/O US: CPT | Mod: PBBFAC,PO | Performed by: INTERNAL MEDICINE

## 2018-09-04 PROCEDURE — 99212 OFFICE O/P EST SF 10 MIN: CPT | Mod: PBBFAC,PN | Performed by: PODIATRIST

## 2018-09-04 PROCEDURE — 11721 DEBRIDE NAIL 6 OR MORE: CPT | Mod: S$PBB,59,Q9, | Performed by: PODIATRIST

## 2018-09-04 PROCEDURE — 99214 OFFICE O/P EST MOD 30 MIN: CPT | Mod: 25,S$PBB,, | Performed by: INTERNAL MEDICINE

## 2018-09-04 PROCEDURE — 99999 PR PBB SHADOW E&M-EST. PATIENT-LVL II: CPT | Mod: PBBFAC,,, | Performed by: PODIATRIST

## 2018-09-04 PROCEDURE — 99213 OFFICE O/P EST LOW 20 MIN: CPT | Mod: PBBFAC,25,27,PO | Performed by: INTERNAL MEDICINE

## 2018-09-04 PROCEDURE — 99203 OFFICE O/P NEW LOW 30 MIN: CPT | Mod: S$PBB,25,, | Performed by: PODIATRIST

## 2018-09-04 PROCEDURE — 99999 PR PBB SHADOW E&M-EST. PATIENT-LVL III: CPT | Mod: PBBFAC,,, | Performed by: INTERNAL MEDICINE

## 2018-09-04 PROCEDURE — 11055 PARING/CUTG B9 HYPRKER LES 1: CPT | Mod: S$PBB,Q9,, | Performed by: PODIATRIST

## 2018-09-04 PROCEDURE — 11721 DEBRIDE NAIL 6 OR MORE: CPT | Mod: PBBFAC,PN | Performed by: PODIATRIST

## 2018-09-04 RX ORDER — PREDNISONE 2.5 MG/1
2.5 TABLET ORAL DAILY
COMMUNITY
End: 2018-09-04 | Stop reason: SDUPTHER

## 2018-09-04 RX ORDER — DEXAMETHASONE SODIUM PHOSPHATE 4 MG/ML
8 INJECTION, SOLUTION INTRA-ARTICULAR; INTRALESIONAL; INTRAMUSCULAR; INTRAVENOUS; SOFT TISSUE
Status: COMPLETED | OUTPATIENT
Start: 2018-09-04 | End: 2018-09-04

## 2018-09-04 RX ORDER — TRAMADOL HYDROCHLORIDE 50 MG/1
50 TABLET ORAL
COMMUNITY
Start: 2017-09-29 | End: 2018-09-04 | Stop reason: SDUPTHER

## 2018-09-04 RX ORDER — DULOXETIN HYDROCHLORIDE 30 MG/1
30 CAPSULE, DELAYED RELEASE ORAL NIGHTLY
Qty: 30 CAPSULE | Refills: 12 | Status: SHIPPED | OUTPATIENT
Start: 2018-09-04 | End: 2019-09-30 | Stop reason: SDUPTHER

## 2018-09-04 RX ORDER — OMEPRAZOLE 20 MG/1
20 CAPSULE, DELAYED RELEASE ORAL DAILY
COMMUNITY

## 2018-09-04 RX ORDER — ACETAMINOPHEN AND CODEINE PHOSPHATE 300; 30 MG/1; MG/1
1 TABLET ORAL DAILY
Qty: 30 TABLET | Refills: 3 | Status: SHIPPED | OUTPATIENT
Start: 2018-09-04 | End: 2019-10-03 | Stop reason: SDUPTHER

## 2018-09-04 RX ORDER — TRAMADOL HYDROCHLORIDE 50 MG/1
50 TABLET ORAL EVERY 8 HOURS PRN
Qty: 90 TABLET | Refills: 3 | Status: SHIPPED | OUTPATIENT
Start: 2018-09-04 | End: 2019-07-01

## 2018-09-04 RX ORDER — PREDNISONE 2.5 MG/1
2.5 TABLET ORAL DAILY
Qty: 90 TABLET | Refills: 3 | Status: SHIPPED | OUTPATIENT
Start: 2018-09-04 | End: 2019-10-03 | Stop reason: SDUPTHER

## 2018-09-04 RX ADMIN — DEXAMETHASONE SODIUM PHOSPHATE 8 MG: 4 INJECTION, SOLUTION INTRA-ARTICULAR; INTRALESIONAL; INTRAMUSCULAR; INTRAVENOUS; SOFT TISSUE at 11:09

## 2018-09-04 RX ADMIN — TRIAMCINOLONE ACETONIDE 40 MG: 40 INJECTION, SUSPENSION INTRA-ARTICULAR; INTRAMUSCULAR at 11:09

## 2018-09-04 ASSESSMENT — ROUTINE ASSESSMENT OF PATIENT INDEX DATA (RAPID3)
TOTAL RAPID3 SCORE: 4
PATIENT GLOBAL ASSESSMENT SCORE: 5
PAIN SCORE: 5
MDHAQ FUNCTION SCORE: .6
PSYCHOLOGICAL DISTRESS SCORE: 2.2

## 2018-09-04 NOTE — LETTER
September 4, 2018      Ralf Farias MD  [address] [facility] [address] [place] [address]           [facility]  [address] [facility] [address] [place] [address]  Phone: [phone]          Patient: Priscilla Landry   MR Number: [id number]   YOB: 1940   Date of Visit: 9/4/2018       Dear Dr. Ralf Farias:    Thank you for referring Priscilla Landry to me for evaluation. Attached you will find relevant portions of my assessment and plan of care.    If you have questions, please do not hesitate to call me. I look forward to following Priscilla Landry along with you.    Sincerely,    Tremaine Carter, TESS    Enclosure  CC:  No Recipients    If you would like to receive this communication electronically, please contact [email or web address] or [phone] to request more information on [email or web address] Link access.    For providers and/or their staff who would like to refer a patient to [facility], please contact us through our one-stop-shop provider referral line, [facility], at [phone].    If you feel you have received this communication in error or would no longer like to receive these types of communications, please e-mail [email or web address]

## 2018-09-04 NOTE — PROCEDURES
Large Joint Aspiration/Injection: R glenohumeral  Date/Time: 9/4/2018 11:28 AM  Performed by: Ralf Farias MD  Authorized by: Ralf Farias MD     Consent Done?:  Yes (Verbal)  Indications:  Pain and joint swelling  Procedure site marked: Yes      Location:  Shoulder  Site:  R glenohumeral  Prep: Patient was prepped and draped in usual sterile fashion    Needle size:  25 G  Medications:  40 mg triamcinolone acetonide 40 mg/mL    Additional Comments: After verbal consent and cleansing with betadine and alcohol, skin was numbed with ethylene chloride spray 2cc 1% lidocaine was injected into the  right shoulder after waiting 45 sec  Shoulder was injected with Kenalog 40mg with 1 ml 1 % lidocaine. Patient tolerated procedure well.

## 2018-09-04 NOTE — PROGRESS NOTES
Subjective:       Patient ID: Priscilla Landry is a 78 y.o. female.    Chief Complaint: Follow-up    Follow up : PSA on Humira with psa,  B shoulder pain decrease ROM, Patient complains of arthralgias and myalgias for which has been present for a few years. Pain is located in multiple joints, both shoulder(s), both elbow(s), both wrist(s), both MCP(s): 1st, 2nd, 3rd, 4th and 5th, both PIP(s): 1st, 2nd, 3rd, 4th and 5th, both DIP(s): 1st and 2nd, both hip(s), both knee(s) and both MTP(s): 1st, 2nd, 3rd, 4th and 5th, is described as aching, pulsating, shooting and throbbing, and is constant, moderate .  Associated symptoms include: crepitation, decreased range of motion, edema, effusion, tenderness and warmth.       Review of Systems   Constitutional: Negative for activity change, appetite change, chills, diaphoresis and unexpected weight change.   HENT: Negative for congestion, dental problem, ear discharge, ear pain, facial swelling, mouth sores, nosebleeds, postnasal drip, rhinorrhea, sinus pressure, sneezing, sore throat, tinnitus and voice change.    Eyes: Negative for photophobia, pain, discharge, redness and itching.   Respiratory: Negative for apnea, cough, chest tightness, shortness of breath and wheezing.    Cardiovascular: Positive for leg swelling. Negative for chest pain and palpitations.   Gastrointestinal: Negative for abdominal distention, abdominal pain, constipation, diarrhea, nausea and vomiting.   Endocrine: Negative for cold intolerance, heat intolerance, polydipsia and polyuria.   Genitourinary: Negative for decreased urine volume, difficulty urinating, flank pain, frequency, hematuria and urgency.   Musculoskeletal: Positive for arthralgias, back pain, gait problem, neck pain and neck stiffness.   Skin: Negative for pallor, rash and wound.   Allergic/Immunologic: Negative for immunocompromised state.   Neurological: Negative for dizziness, tremors, weakness and numbness.   Hematological:  "Negative for adenopathy. Does not bruise/bleed easily.   Psychiatric/Behavioral: Negative for sleep disturbance. The patient is not nervous/anxious.          Objective:   BP (!) 115/58 (BP Location: Left arm, Patient Position: Sitting, BP Method: Large (Automatic))   Pulse 67   Ht 5' 4" (1.626 m)   Wt 95.7 kg (210 lb 13.9 oz)   BMI 36.20 kg/m²      Physical Exam   Vitals reviewed.  Constitutional: She is oriented to person, place, and time and well-developed, well-nourished, and in no distress.   HENT:   Head: Normocephalic and atraumatic.   Mouth/Throat: Oropharynx is clear and moist.   Eyes: EOM are normal. Pupils are equal, round, and reactive to light.   Neck: Neck supple. No thyromegaly present.   Cardiovascular: Normal rate, regular rhythm and normal heart sounds.  Exam reveals no gallop and no friction rub.    No murmur heard.  Pulmonary/Chest: Breath sounds normal. She has no wheezes. She has no rales. She exhibits no tenderness.   Abdominal: There is no tenderness. There is no rebound and no guarding.       Right Side Rheumatological Exam     Examination finds the elbow, 2nd MCP, 3rd MCP, 4th MCP and 5th MCP normal.    The patient is tender to palpation of the shoulder and knee    She has swelling of the shoulder, knee, 2nd PIP, 3rd PIP, 4th PIP and 5th PIP    The patient has an enlarged shoulder, wrist, 1st PIP, 2nd PIP, 3rd PIP, 4th PIP and 5th PIP    Shoulder Exam   Tenderness Location: acromion    Range of Motion   Active abduction: abnormal   Adduction: abnormal  Sensation: normal    Knee Exam   Tenderness Location: medial joint line  Patellofemoral Crepitus: positive  Effusion: positive  Sensation: normal    Hip Exam   Tenderness Location: no tenderness  Sensation: normal    Elbow/Wrist Exam   Tenderness Location: no tenderness  Sensation: normal    Left Side Rheumatological Exam     Examination finds the elbow, knee, 1st MCP, 2nd MCP, 3rd MCP, 4th MCP and 5th MCP normal.    The patient is tender " to palpation of the shoulder.    She has swelling of the shoulder, 1st PIP, 2nd PIP, 3rd PIP, 4th PIP and 5th PIP    The patient has an enlarged shoulder, wrist, 1st PIP, 2nd PIP, 3rd PIP, 4th PIP and 5th PIP.    Shoulder Exam   Tenderness Location: acromion    Range of Motion   Active abduction: abnormal   Sensation: normal    Knee Exam   Tenderness Location: lateral joint line and medial joint line    Patellofemoral Crepitus: positive  Effusion: positive  Sensation: normal    Hip Exam   Tenderness Location: no tenderness  Sensation: normal    Elbow/Wrist Exam   Sensation: normal      Back/Neck Exam   General Inspection   Gait: normal       Tenderness Right paramedian tenderness of the Lower C-Spine and Lower L-Spine.Left paramedian tenderness of the Upper C-Spine and Lower L-Spine.      Lymphadenopathy:     She has no cervical adenopathy.   Neurological: She is alert and oriented to person, place, and time. She has normal sensation and normal strength. Gait normal.   Reflex Scores:       Patellar reflexes are 3+ on the right side and 3+ on the left side.  Skin: No rash noted. No erythema. No pallor.     Psoriasis B elbows, knees, back  feet   Psychiatric: Mood and affect normal.   Musculoskeletal: She exhibits tenderness and deformity. She exhibits no edema.        Results for orders placed or performed in visit on 02/07/18   Hepatitis C RNA, quantitative, PCR   Result Value Ref Range    HCV Log <1.08 <1.08 Log (10) IU/mL    HCV, Qualitative Not detected Not detected IU/mL    HCV RNA Quant PCR <12 <12 IU/mL   Hepatitis C Viral RNA Genotype, LIPA   Result Value Ref Range    Hepatitis C Viral RNA Genotype, LIPA See result image under hyperlink Not detected   CBC auto differential   Result Value Ref Range    WBC 7.80 3.90 - 12.70 K/uL    RBC 3.88 (L) 4.00 - 5.40 M/uL    Hemoglobin 12.1 12.0 - 16.0 g/dL    Hematocrit 38.0 37.0 - 48.5 %    MCV 98 82 - 98 fL    MCH 31.2 (H) 27.0 - 31.0 pg    MCHC 31.8 (L) 32.0 - 36.0  g/dL    RDW 12.6 11.5 - 14.5 %    Platelets 155 150 - 350 K/uL    MPV 11.7 9.2 - 12.9 fL    Gran # (ANC) 4.4 1.8 - 7.7 K/uL    Lymph # 2.5 1.0 - 4.8 K/uL    Mono # 0.6 0.3 - 1.0 K/uL    Eos # 0.3 0.0 - 0.5 K/uL    Baso # 0.02 0.00 - 0.20 K/uL    Gran% 56.5 38.0 - 73.0 %    Lymph% 32.2 18.0 - 48.0 %    Mono% 7.4 4.0 - 15.0 %    Eosinophil% 3.6 0.0 - 8.0 %    Basophil% 0.3 0.0 - 1.9 %    Differential Method Automated    Comprehensive metabolic panel   Result Value Ref Range    Sodium 141 136 - 145 mmol/L    Potassium 4.6 3.5 - 5.1 mmol/L    Chloride 107 95 - 110 mmol/L    CO2 23 23 - 29 mmol/L    Glucose 60 (L) 70 - 110 mg/dL    BUN, Bld 41 (H) 8 - 23 mg/dL    Creatinine 1.9 (H) 0.5 - 1.4 mg/dL    Calcium 10.0 8.7 - 10.5 mg/dL    Total Protein 9.1 (H) 6.0 - 8.4 g/dL    Albumin 3.8 3.5 - 5.2 g/dL    Total Bilirubin 0.7 0.1 - 1.0 mg/dL    Alkaline Phosphatase 84 55 - 135 U/L    AST 36 10 - 40 U/L    ALT 22 10 - 44 U/L    Anion Gap 11 8 - 16 mmol/L    eGFR if African American 28.7 (A) >60 mL/min/1.73 m^2    eGFR if non African American 24.9 (A) >60 mL/min/1.73 m^2   Sedimentation rate, manual   Result Value Ref Range    Sed Rate 70 (H) 0 - 20 mm/Hr   C-reactive protein   Result Value Ref Range    CRP 3.1 0.0 - 8.2 mg/L   HEPATIC FUNCTION PANEL   Result Value Ref Range    Total Protein 9.1 (H) 6.0 - 8.4 g/dL    Albumin 3.8 3.5 - 5.2 g/dL    Total Bilirubin 0.7 0.1 - 1.0 mg/dL    Bilirubin, Direct 0.3 0.1 - 0.3 mg/dL    AST 36 10 - 40 U/L    ALT 22 10 - 44 U/L    Alkaline Phosphatase 84 55 - 135 U/L     Uric Acid (05/29/2018 7:22 AM)  Uric Acid (05/29/2018 7:22 AM)   Component Value Ref Range   Uric Acid 6.2 2.5 - 6.5 mg/dL     Uric Acid (05/29/2018 7:22 AM)   Specimen Performing Laboratory   [facility]    [address] [place] [address]     Back to top of Lab Results       C-Reactive Protein (05/29/2018 7:22 AM)  C-Reactive Protein (05/29/2018 7:22 AM)   Component Value Ref Range   C-Reactive Protein,  Quant - [facility] 2.2 0.0 - 4.9 mg/L     C-Reactive Protein (05/29/2018 7:22 AM)   Specimen Performing Laboratory   [facility]       C-Reactive Protein (05/29/2018 7:22 AM)   Narrative   Performed at:   - [facility] [address]  [id number]    : Srini Quiros MD, Phone:  [phone]     Back to top of Lab Results       Sedimentation Rate (05/29/2018 7:22 AM)  Sedimentation Rate (05/29/2018 7:22 AM)   Component Value Ref Range   Erythrocyte Sedimentation Rate 50 (H) 0 - 30 mm/hr     Sedimentation Rate (05/29/2018 7:22 AM)   Specimen Performing Laboratory   [facility]    [id number] [address] [address]     Back to top of Lab Results       Comprehensive Metabolic Panel (05/29/2018 7:22 AM)  Comprehensive Metabolic Panel (05/29/2018 7:22 AM)   Component Value Ref Range   Sodium 140 135 - 146 mmol/L   Potassium 5.2 3.6 - 5.2 mmol/L   Chloride 105 96 - 110 mmol/L   Carbon Dioxide 24 24 - 32 mmol/L   Glucose 184 (H) 65 - 99 mg/dL   BUN 61.0 (H) 7.0 - 25.0 mg/dL   Creatinine 1.76 (H) 0.50 - 1.10 mg/dL   Calcium 10.1 8.4 - 10.3 mg/dL   Total Protein 8.2 (H) 6.0 - 8.0 g/dL   Albumin 4.2 3.4 - 5.0 g/dL   AST 34 <45 U/L   ALT 26 <46 U/L   Alkaline Phosphatase 78 20 - 120 U/L   Bilirubin, Total 1.1 <1.3 mg/dL   EGFR,  32 (L) >89 mL/min   EGFR, Non African American 27 (L) >89 mL/min     Comprehensive Metabolic Panel (05/29/2018 7:22 AM)   Specimen Performing Laboratory   Blood [facility]    [id number] [address] [address]     Back to top of Lab Results      Visit Diagnoses  - in this encounter      Assessment:       1. PSA (psoriatic arthritis)    2. Osteoarthritis, unspecified osteoarthritis type, unspecified site    3. Hepatitis C carrier    4. Gouty arthritis    5. Wrist pain, acute, right            Plan:        Priscilla was seen today for follow-up.    Diagnoses and all orders for this visit:    PSA (psoriatic arthritis)  -      arm brace (NEOPRENE WRIST SPLINT SUPPORT) Misc; 1 each by Misc.(Non-Drug; Combo Route) route once daily.  -     dexamethasone injection 8 mg; Inject 2 mLs (8 mg total) into the muscle one time.  -     CBC auto differential; Future  -     Comprehensive metabolic panel; Future  -     C-reactive protein; Future  -     Sedimentation rate; Future  -     TSH; Future  -     CBC auto differential  -     Comprehensive metabolic panel  -     C-reactive protein  -     Sedimentation rate  -     TSH    Osteoarthritis, unspecified osteoarthritis type, unspecified site  -     arm brace (NEOPRENE WRIST SPLINT SUPPORT) Misc; 1 each by Misc.(Non-Drug; Combo Route) route once daily.  -     dexamethasone injection 8 mg; Inject 2 mLs (8 mg total) into the muscle one time.  -     CBC auto differential; Future  -     Comprehensive metabolic panel; Future  -     C-reactive protein; Future  -     Sedimentation rate; Future  -     TSH; Future  -     CBC auto differential  -     Comprehensive metabolic panel  -     C-reactive protein  -     Sedimentation rate  -     TSH    Hepatitis C carrier  -     arm brace (NEOPRENE WRIST SPLINT SUPPORT) Misc; 1 each by Misc.(Non-Drug; Combo Route) route once daily.  -     dexamethasone injection 8 mg; Inject 2 mLs (8 mg total) into the muscle one time.  -     CBC auto differential; Future  -     Comprehensive metabolic panel; Future  -     C-reactive protein; Future  -     Sedimentation rate; Future  -     TSH; Future  -     CBC auto differential  -     Comprehensive metabolic panel  -     C-reactive protein  -     Sedimentation rate  -     TSH    Gouty arthritis  -     arm brace (NEOPRENE WRIST SPLINT SUPPORT) Misc; 1 each by Misc.(Non-Drug; Combo Route) route once daily.  -     dexamethasone injection 8 mg; Inject 2 mLs (8 mg total) into the muscle one time.  -     CBC auto differential; Future  -     Comprehensive metabolic panel; Future  -     C-reactive protein; Future  -     Sedimentation rate;  Future  -     TSH; Future  -     CBC auto differential  -     Comprehensive metabolic panel  -     C-reactive protein  -     Sedimentation rate  -     TSH    Wrist pain, acute, right  -     arm brace (NEOPRENE WRIST SPLINT SUPPORT) Misc; 1 each by Misc.(Non-Drug; Combo Route) route once daily.  -     dexamethasone injection 8 mg; Inject 2 mLs (8 mg total) into the muscle one time.  -     CBC auto differential; Future  -     Comprehensive metabolic panel; Future  -     C-reactive protein; Future  -     Sedimentation rate; Future  -     TSH; Future  -     CBC auto differential  -     Comprehensive metabolic panel  -     C-reactive protein  -     Sedimentation rate  -     TSH    Abnormal results of thyroid function studies   -     TSH; Future  -     TSH    Chronic right shoulder pain  -     Large Joint Aspiration/Injection: R glenohumeral    Other orders  -     traMADol (ULTRAM) 50 mg tablet; Take 1 tablet (50 mg total) by mouth every 8 (eight) hours as needed.  -     predniSONE (DELTASONE) 2.5 MG tablet; Take 1 tablet (2.5 mg total) by mouth once daily.  -     acetaminophen-codeine 300-30mg (TYLENOL #3) 300-30 mg Tab; Take 1 tablet by mouth once daily.  -     DULoxetine (CYMBALTA) 30 MG capsule; Take 1 capsule (30 mg total) by mouth every evening. In the pm     no change added tramadal refill humira,  Pt power chair is expiring she states that someone is contacting her for another  Model she will call us wit the info

## 2018-09-04 NOTE — PROGRESS NOTES
Administered 2 cc dexamethasone 4mg/cc  to left upper outer gluteal. Pt tolerated well. No acute reaction noted to site. Pt instructed on S/S to report. Pt verbalized understanding.     Lot: [id number]  Exp: 03/2020

## 2018-09-05 NOTE — PROGRESS NOTES
"Subjective:      Patient ID: Priscilla Landry is a 78 y.o. female.    Chief Complaint: Diabetic Foot Exam and Diabetes Mellitus    Priscilla is a 78 y.o. female who presents to the clinic upon referral from Dr. Farias  for evaluation and treatment of diabetic feet. Priscilla has a past medical history of Cataracts, bilateral, Coronary artery disease, Diabetes mellitus, Glaucoma, Hyperlipidemia, Hypertension, Psoriasis, and Type 2 diabetes mellitus. Patient denies major issues with the lower extremities with today's exam.  Relates having a TMA of the Lt. Foot, over 6 years ago, secondary to infectious and circulatory issues with the extremity.  Relates having stent placement in both lower extremities around the time of the amputation.  Notes callus build up to the stump of the extremity, however, denies this being associated with overt pain.  Notes having neuropathy, specifically numbness and occasional "stingning" pain in the feet.  States symptoms are more prevalent at night and tend to be alleviated with activity.  Has not attempted to self treat.  Denies any additional pedal complaints.      PCP: Jonna Contreras MD    Date Last Seen by PCP: 6/5/18    No results found for: HGBA1C          Past Medical History:   Diagnosis Date    Cataracts, bilateral     Coronary artery disease     Diabetes mellitus     Glaucoma     Hyperlipidemia     Hypertension     Psoriasis     Type 2 diabetes mellitus        Past Surgical History:   Procedure Laterality Date    CARDIAC CATHETERIZATION      CAROTID STENT      CHOLECYSTECTOMY      HYSTERECTOMY      KIDNEY STONE SURGERY         Family History   Problem Relation Age of Onset    Hypertension Mother     Heart disease Mother     Diabetes Mellitus Mother        Social History     Socioeconomic History    Marital status:      Spouse name: None    Number of children: None    Years of education: None    Highest education level: None   Social Needs    Financial " resource strain: None    Food insecurity - worry: None    Food insecurity - inability: None    Transportation needs - medical: None    Transportation needs - non-medical: None   Occupational History    None   Tobacco Use    Smoking status: Never Smoker    Smokeless tobacco: Never Used   Substance and Sexual Activity    Alcohol use: No    Drug use: No    Sexual activity: None   Other Topics Concern    None   Social History Narrative    None       Current Outpatient Medications   Medication Sig Dispense Refill    acetaminophen-codeine 300-30mg (TYLENOL #3) 300-30 mg Tab Take 1 tablet by mouth once daily. 30 tablet 3    adalimumab (HUMIRA) PnKt injection Inject 0.8 mLs (40 mg total) into the skin every 14 (fourteen) days. 1.6 mL 11    allopurinol (ZYLOPRIM) 300 MG tablet Take 300 mg by mouth once daily.      arm brace (NEOPRENE WRIST SPLINT SUPPORT) Misc 1 each by Misc.(Non-Drug; Combo Route) route once daily. 1 each 0    aspirin (ECOTRIN) 325 MG EC tablet Take 325 mg by mouth once daily.      atorvastatin (LIPITOR) 40 MG tablet Take 40 mg by mouth every evening.      calcitRIOL (ROCALTROL) 0.5 MCG Cap Take 0.5 mcg by mouth once daily.      calcium carbonate (OS-ANGIE) 600 mg calcium (1,500 mg) Tab Take 600 mg by mouth once. Take one tablet at noon      clopidogrel (PLAVIX) 75 mg tablet Take 75 mg by mouth once daily.      DULoxetine (CYMBALTA) 30 MG capsule Take 1 capsule (30 mg total) by mouth every evening. In the pm 30 capsule 12    furosemide (LASIX) 20 MG tablet Take 20 mg by mouth 2 (two) times daily.      gabapentin (NEURONTIN) 400 MG capsule Take 400 mg by mouth every evening.      insulin glargine (LANTUS) 100 unit/mL injection Inject 90 Units into the skin.      insulin NPH (NOVOLIN N NPH U-100 INSULIN) 100 unit/mL injection INJECT 35 UNITS SUBCUTANEOUSLY BEFORE BREAKFAST AND 35 UNITS SUBCUTANEOUSLY BEFORE LUNCH *THANK YOU*      lancets (TRUEPLUS LANCETS) 28 gauge Misc USE AS  DIRECTED 3 TIMES DAILY *THANK YOU*      levothyroxine (SYNTHROID) 25 MCG tablet Take 25 mcg by mouth before breakfast.      loratadine (CLARITIN) 10 mg tablet TAKE 1 TABLET AT BEDTIME FOR SINUS AND ALLERGIES *THANK YOU*      metoprolol tartrate (LOPRESSOR) 25 MG tablet Take 25 mg by mouth 2 (two) times daily.      multivitamin with minerals tablet Take 1 tablet by mouth once daily. Take 1 tablet at noon      omeprazole (PRILOSEC) 20 MG capsule Take 20 mg by mouth once daily.      predniSONE (DELTASONE) 2.5 MG tablet Take 1 tablet (2.5 mg total) by mouth once daily. 90 tablet 3    traMADol (ULTRAM) 50 mg tablet Take 1 tablet (50 mg total) by mouth every 8 (eight) hours as needed. 90 tablet 3     No current facility-administered medications for this visit.        Review of patient's allergies indicates:   Allergen Reactions    Strawberry Hives    Sulfa (sulfonamide antibiotics) Swelling     Unknown^         Review of Systems   Constitution: Negative for chills and fever.   Cardiovascular: Positive for leg swelling. Negative for claudication.   Skin: Positive for color change and nail changes.   Musculoskeletal: Positive for arthritis, joint pain and joint swelling. Negative for muscle cramps and muscle weakness.   Neurological: Positive for numbness and paresthesias.   Psychiatric/Behavioral: Negative for altered mental status.           Objective:      Physical Exam   Constitutional: She is oriented to person, place, and time. She appears well-developed and well-nourished. No distress.   Cardiovascular:   Pulses:       Dorsalis pedis pulses are 1+ on the right side, and 1+ on the left side.        Posterior tibial pulses are 1+ on the right side, and 1+ on the left side.   CFT is 3 seconds on the Rt.  Pedal hair growth is absent bilateral.  Varicosities noted bilateral.  Moderate non pitting edema noted to bilateral lower extremity.  Toes are cool to touch on the Rt.   Musculoskeletal: She exhibits edema.  She exhibits no tenderness.   Muscle strength 5/5 in all muscle groups bilateral.  No tenderness nor crepitation with ROM of foot/ankle joints bilateral.  No tenderness with palpation of bilateral foot and ankle.  Bilateral pes planus foot type.  Lt. TMA.  Rt. Hallux abducto valgus.  Rt. Semi-rigid contracture of toes 2-5.   Neurological: She is alert and oriented to person, place, and time. She has normal strength. A sensory deficit is present.   Protective sensation per [place] monofilament is decreased bilateral.  Vibratory sensation is absent to the level of the malleoli bilateral.  Light touch is intact bilateral.   Skin: Skin is warm and dry. Capillary refill takes 2 to 3 seconds. Lesion noted. No abrasion, no bruising, no burn, no ecchymosis, no petechiae and no rash noted. She is not diaphoretic. No erythema. No pallor.   Pedal skin appears edematous bilateral.  Toenails x 5 appear thickened by 2 mm's, elongated by 2 mm's, and discolored with subungual debris.  Hyperkeratotic lesion noted to the Lt. TMA stump site.  Examination of the skin reveals no evidence of significant maceration, rashes, open lesions, suspicious appearing nevi or other concerning lesions.              Assessment:       Encounter Diagnoses   Name Primary?    Diabetic polyneuropathy associated with type 2 diabetes mellitus Yes    History of transmetatarsal amputation of left foot     Corn or callus     Onychomycosis due to dermatophyte     PAD (peripheral artery disease)     Hammer toe of right foot          Plan:       Priscilla was seen today for diabetic foot exam and diabetes mellitus.    Diagnoses and all orders for this visit:    Diabetic polyneuropathy associated with type 2 diabetes mellitus    History of transmetatarsal amputation of left foot    Corn or callus    Onychomycosis due to dermatophyte    PAD (peripheral artery disease)    Hammer toe of right foot      I counseled the patient on her conditions, their  implications and medical management.    Advised to continue wearing shoe gear that accommodates digital deformities.    Given both verbal and written information regarding B-complex vitamins.  Take as instructed in clinic.  Will consider topical gabapentin should this fail to reduce neuropathy pain.    Shoe inspection. Diabetic Foot Education. Patient reminded of the importance of good nutrition and blood sugar control to help prevent podiatric complications of diabetes. Patient instructed on proper foot hygeine. We discussed wearing proper shoe gear, daily foot inspections, never walking without protective shoe gear, never putting sharp instruments to feet    With patient's permission, nails were aggressively reduced and debrided x 10 to their soft tissue attachment mechanically and with electric , removing all offending nail and debris.  Also, a sterile #15 scalpel was used to trim the lesion x 1 down to smooth appearing skin without incident.  Patient relates relief following the procedure. She will continue to monitor the areas daily, inspect her feet, wear protective shoe gear when ambulatory, moisturizer to maintain skin integrity and follow in this office in approximately 2-3 months, sooner p.r.n.    Follow-up in about 3 months (around 12/4/2018).    Tremaine Carter DPM

## 2018-09-09 RX ORDER — TRIAMCINOLONE ACETONIDE 40 MG/ML
40 INJECTION, SUSPENSION INTRA-ARTICULAR; INTRAMUSCULAR
Status: DISCONTINUED | OUTPATIENT
Start: 2018-09-04 | End: 2018-09-09 | Stop reason: HOSPADM

## 2018-09-28 ENCOUNTER — TELEPHONE (OUTPATIENT)
Dept: RHEUMATOLOGY | Facility: CLINIC | Age: 78
End: 2018-09-28

## 2018-09-28 NOTE — TELEPHONE ENCOUNTER
----- Message from [employer] sent at 9/28/2018 10:37 AM CDT -----  Contact: same  Patient called in and stated she was supposed to forward a phone number to office about her [employer] chair.  Phone number is [phone] & that is for the [employer].    Patient call back number is [phone]

## 2018-10-03 NOTE — TELEPHONE ENCOUNTER
Spoke with Marline at [facility], they last spoke to patient in May about going forward with new chair. They need her information to get mobility exam for new chair. Discussed pt's dx and mobility issues. They need OV from 9/4/18 to indicate why pt needs continued usage fo power chair. And those notes faxed to them at [phone]. Please adjust notes to indicate pt's mobility and need for continued use of power chair for replacement.

## 2018-10-05 NOTE — TELEPHONE ENCOUNTER
----- Message from Morena Ambrocio sent at 10/5/2018 12:32 PM CDT -----  Contact: Marline from [facility]  Type: Needs Medical Advice    Who Called: Marline from [facility]  Symptoms (please be specific):  NA  How long has patient had these symptoms:  na  Pharm[facility] and phone #:  [email or web address]  Best Call Back Number: [phone]   Additional Information: Marline is calling to check the status of a fax that was sent to the office on 9/4/18, please call to discuss, thank you!

## 2018-10-16 ENCOUNTER — TELEPHONE (OUTPATIENT)
Dept: RHEUMATOLOGY | Facility: CLINIC | Age: 78
End: 2018-10-16

## 2018-10-16 DIAGNOSIS — L40.50 PSA (PSORIATIC ARTHRITIS): Primary | ICD-10-CM

## 2018-10-16 DIAGNOSIS — M47.817 LUMBAR AND SACRAL OSTEOARTHRITIS: ICD-10-CM

## 2018-10-16 NOTE — TELEPHONE ENCOUNTER
----- Message from Annabelle Wagner sent at 10/15/2018 11:26 AM CDT -----  Contact: Nicanor with [employer] is calling to request the paperwork from patient's evaluation regarding a powered wheelchair.  Call Back#[phone]  Fax#[phone]  Thanks

## 2018-10-29 ENCOUNTER — OFFICE VISIT (OUTPATIENT)
Dept: RHEUMATOLOGY | Facility: CLINIC | Age: 78
End: 2018-10-29
Payer: MEDICARE

## 2018-10-29 ENCOUNTER — TELEPHONE (OUTPATIENT)
Dept: RHEUMATOLOGY | Facility: CLINIC | Age: 78
End: 2018-10-29

## 2018-10-29 VITALS
DIASTOLIC BLOOD PRESSURE: 64 MMHG | HEART RATE: 63 BPM | WEIGHT: 210.13 LBS | SYSTOLIC BLOOD PRESSURE: 140 MMHG | BODY MASS INDEX: 35.87 KG/M2 | HEIGHT: 64 IN

## 2018-10-29 DIAGNOSIS — L40.50 PSA (PSORIATIC ARTHRITIS): Primary | ICD-10-CM

## 2018-10-29 DIAGNOSIS — G56.03 CARPAL TUNNEL SYNDROME, BILATERAL: ICD-10-CM

## 2018-10-29 DIAGNOSIS — M10.9 GOUTY ARTHRITIS: ICD-10-CM

## 2018-10-29 DIAGNOSIS — L40.8 PSORIASIS WITH PUSTULES: ICD-10-CM

## 2018-10-29 DIAGNOSIS — M47.817 LUMBAR AND SACRAL SPONDYLARTHRITIS: ICD-10-CM

## 2018-10-29 DIAGNOSIS — B18.2 HEPATITIS C CARRIER: ICD-10-CM

## 2018-10-29 DIAGNOSIS — M19.90 OSTEOARTHRITIS, UNSPECIFIED OSTEOARTHRITIS TYPE, UNSPECIFIED SITE: ICD-10-CM

## 2018-10-29 DIAGNOSIS — M25.531 WRIST PAIN, ACUTE, RIGHT: ICD-10-CM

## 2018-10-29 PROCEDURE — 99213 OFFICE O/P EST LOW 20 MIN: CPT | Mod: PBBFAC,PO | Performed by: INTERNAL MEDICINE

## 2018-10-29 PROCEDURE — 99999 PR PBB SHADOW E&M-EST. PATIENT-LVL III: CPT | Mod: PBBFAC,,, | Performed by: INTERNAL MEDICINE

## 2018-10-29 PROCEDURE — 99215 OFFICE O/P EST HI 40 MIN: CPT | Mod: 25,S$PBB,, | Performed by: INTERNAL MEDICINE

## 2018-10-29 RX ORDER — CLOBETASOL PROPIONATE 0.5 MG/G
OINTMENT TOPICAL 2 TIMES DAILY
Qty: 45 G | Refills: 3 | Status: SHIPPED | OUTPATIENT
Start: 2018-10-29 | End: 2018-10-30 | Stop reason: SDUPTHER

## 2018-10-29 NOTE — TELEPHONE ENCOUNTER
----- Message from Ivis Alex sent at 10/27/2018 10:34 AM CDT -----  Contact: [phone]  Type:  Sooner Apoointment Request    Caller is requesting a sooner appointment.    Caller is requesting a message be sent to doctor.    Name of Caller:  RONNELL CHAUDHARI [[id number]]  When is the first available appointment?Dec 2018  Symptoms:  eval for [facility] round chair   Best Call Back Number:  [phone]  [facility]# [phone]  Case#[id number]

## 2018-10-29 NOTE — PROGRESS NOTES
Subjective:       Patient ID: Priscilla Landry is a 78 y.o. female.    Chief Complaint: Follow-up    Follow up :  Patient is here for a mobility exam.  Patient is unable to walk without assistance she is unable to cook and clean without assistant is unable to care for herself without assist she is unable to walk more than 5 steps without resting she has psoriatic arthritis affects all of her joints she has peripheral neuropathy from her diabetes she has a gouty arthritis it causes severe pain this has gotten worse over the last 6 months PSA on Humira with psa,psoriasis B elbows,  Am gelling around 30 minutes  B shoulder pain decrease ROM, Patient complains of arthralgias and myalgias for which has been present for a few years. Pain is located in multiple joints, both shoulder(s), both elbow(s), both wrist(s), both MCP(s): 1st, 2nd, 3rd, 4th and 5th, both PIP(s): 1st, 2nd, 3rd, 4th and 5th, both DIP(s): 1st and 2nd, both hip(s), both knee(s) and both MTP(s): 1st, 2nd, 3rd, 4th and 5th, is described as aching, pulsating, shooting and throbbing, and is constant, moderate .  Associated symptoms include: crepitation, decreased range of motion, edema, effusion, tenderness and warmth.       Review of Systems   Constitutional: Negative for activity change, appetite change, chills, diaphoresis and unexpected weight change.   HENT: Negative for congestion, dental problem, ear discharge, ear pain, facial swelling, mouth sores, nosebleeds, postnasal drip, rhinorrhea, sinus pressure, sneezing, sore throat, tinnitus and voice change.    Eyes: Negative for photophobia, pain, discharge, redness and itching.   Respiratory: Negative for apnea, cough, chest tightness, shortness of breath and wheezing.    Cardiovascular: Positive for leg swelling. Negative for chest pain and palpitations.   Gastrointestinal: Negative for abdominal distention, abdominal pain, constipation, diarrhea, nausea and vomiting.   Endocrine: Negative for cold  "intolerance, heat intolerance, polydipsia and polyuria.   Genitourinary: Negative for decreased urine volume, difficulty urinating, flank pain, frequency, hematuria and urgency.   Musculoskeletal: Positive for arthralgias, back pain, gait problem, joint swelling, neck pain and neck stiffness.   Skin: Positive for rash. Negative for pallor and wound.   Allergic/Immunologic: Negative for immunocompromised state.   Neurological: Negative for dizziness, tremors, weakness and numbness.   Hematological: Negative for adenopathy. Does not bruise/bleed easily.   Psychiatric/Behavioral: Negative for sleep disturbance. The patient is not nervous/anxious.          Objective:   BP (!) 140/64 (BP Location: Left arm, Patient Position: Sitting, BP Method: Large (Automatic))   Pulse 63   Ht 5' 4" (1.626 m)   Wt 95.3 kg (210 lb 1.6 oz)   BMI 36.06 kg/m²      Physical Exam   Vitals reviewed.  Constitutional: She is oriented to person, place, and time and well-developed, well-nourished, and in no distress.   HENT:   Head: Normocephalic and atraumatic.   Mouth/Throat: Oropharynx is clear and moist.   Eyes: EOM are normal. Pupils are equal, round, and reactive to light.   Neck: Neck supple. No thyromegaly present.   Cardiovascular: Normal rate, regular rhythm and normal heart sounds.  Exam reveals no gallop and no friction rub.    No murmur heard.  Pulmonary/Chest: Breath sounds normal. She has no wheezes. She has no rales. She exhibits no tenderness.   Abdominal: There is no tenderness. There is no rebound and no guarding.       Right Side Rheumatological Exam     Examination finds the elbow, 2nd MCP, 3rd MCP, 4th MCP and 5th MCP normal.    The patient is tender to palpation of the shoulder and knee    She has swelling of the shoulder, knee, 2nd PIP, 3rd PIP, 4th PIP and 5th PIP    The patient has an enlarged shoulder, wrist, 1st PIP, 2nd PIP, 3rd PIP, 4th PIP and 5th PIP    Shoulder Exam   Tenderness Location: acromion    Range of " Motion   Active abduction: abnormal   Passive abduction: abnormal   Extension: abnormal   Forward Flexion: abnormal   Forward Elevation: abnormal  Adduction: abnormal  Sensation: normal    Knee Exam   Tenderness Location: medial joint line    Range of Motion   Extension: abnormal   Flexion: abnormal   Patellofemoral Crepitus: positive  Effusion: positive  Sensation: normal    Hip Exam   Tenderness Location: anterior    Range of Motion   Abduction: abnormal   Adduction: abnormal   Extension: abnormal   Flexion: abnormal   External rotation: abnormal   Internal rotation: abnormal   Sensation: normal    Elbow/Wrist Exam   Tenderness Location: no tenderness    Range of Motion   Elbow   Extension: abnormal   Flexion: abnormal   Pronation: abnormal   Supination: abnormal     Range of Motion   Wrist   Extension: abnormal   Flexion: abnormal   Abduction: abnormal  Adduction: abnormal  Sensation: normal    Muscle Strength (0-5 scale):  Neck Flexion:  2  Neck Extension: 2  Deltoid:  2  Biceps: 2/5   Triceps:  2  : 2/5   Iliopsoas: 2  Quadriceps:  2   Distal Lower Extremity: 2    Left Side Rheumatological Exam     Examination finds the elbow, knee, 1st MCP, 2nd MCP, 3rd MCP, 4th MCP and 5th MCP normal.    The patient is tender to palpation of the shoulder.    She has swelling of the shoulder, 1st PIP, 2nd PIP, 3rd PIP, 4th PIP and 5th PIP    The patient has an enlarged shoulder, wrist, 1st PIP, 2nd PIP, 3rd PIP, 4th PIP and 5th PIP.    Shoulder Exam   Tenderness Location: acromion    Range of Motion   Active abduction: abnormal   Passive abduction: abnormal   Extension: abnormal   Forward Flexion: abnormal   Forward Elevation: abnormal  Adduction: abnormal  Sensation: normal    Knee Exam   Tenderness Location: lateral joint line and medial joint line    Range of Motion   Extension: abnormal   Flexion: abnormal     Patellofemoral Crepitus: positive  Effusion: positive  Sensation: normal    Hip Exam   Tenderness Location:  anterior    Range of Motion   Abduction: abnormal   Adduction: abnormal   Extension: abnormal   Flexion: abnormal   External rotation: abnormal   Internal rotation: abnormal   Sensation: normal    Elbow/Wrist Exam   Sensation: normal    Muscle Strength (0-5 scale):  Neck Flexion:  2  Neck Extension: 2  Deltoid:  2  Biceps: 2/5   Triceps:  2  :  2/5   Iliopsoas: 2  Quadriceps:  2   Distal Lower Extremity: 2      Back/Neck Exam   General Inspection   Gait: normal       Tenderness Right paramedian tenderness of the Lower C-Spine and Lower L-Spine.Left paramedian tenderness of the Upper C-Spine and Lower L-Spine.      Lymphadenopathy:     She has no cervical adenopathy.   Neurological: She is alert and oriented to person, place, and time. She has normal sensation. She displays weakness and abnormal stance. She exhibits abnormal muscle tone. Gait abnormal.   Reflex Scores:       Patellar reflexes are 3+ on the right side and 3+ on the left side.  Skin: No rash noted. No erythema. No pallor.     Psoriasis B elbows, knees, back  feet   Psychiatric: Mood and affect normal.   Musculoskeletal: She exhibits tenderness and deformity. She exhibits no edema.        CBC with Differential (09/25/2018 7:48 AM)  CBC with Differential (09/25/2018 7:48 AM)   Component Value Ref Range   WBC 4.5 4.5 - 11.0 103/uL   RBC 3.33 (L) 4.00 - 5.20 106/uL   Hemoglobin 10.5 (L) 12.0 - 16.0 gm/dL   Hematocrit 32.0 (L) 35.0 - 46.0 %   MCV 96.1 80.0 - 100.0 fL   MCH 31.6 26.0 - 34.0 pg   MCHC 32.9 31.0 - 37.0 g/dL   RDW 13.3 11.5 - 14.5 %   Platelet Count 122 (L) 130 - 400 103/uL   MPV 9.8 7.4 - 10.4 fL   Neutrophils Absolute - Instrument 2.60 1.80 - 8.00 103/uL   Lymphocytes Absolute - Instrument 1.40 1.10 - 5.00 103/uL   Monocytes Absolute - Instrument 0.30 0.20 - 1.10 103/uL   Eosinophils Absolute - Instrument 0.20 0.00 - 0.60 103/uL   Basophils Absolute - Instrument 0.00 0.00 - 0.20 103/uL   Neutrophils Percent - Instrument 58.1 40 -  75 %   Lymphocytes Percent - Instrument 29.7 18 - 46 %   Monocytes Percent - Instrument 7.6 2 - 10 %   Eosinophils Percent - Instrument 3.7 0 - 6 %   Basophils Percent - Instrument 0.9 0 - 2 %     CBC with Differential (09/25/2018 7:48 AM)   Specimen Performing Laboratory   Blood [facility]    [address] [place] [id number]     Back to top of Lab Results       TSH with Reflex (09/25/2018 7:48 AM)  TSH with Reflex (09/25/2018 7:48 AM)   Component Value Ref Range   TSH 4.18 0.50 - 5.00 uIU/mL     TSH with Reflex (09/25/2018 7:48 AM)   Specimen Performing Laboratory   Blood [facility]    [address] [place] [id number]     Back to top of Lab Results       Sedimentation Rate (09/25/2018 7:48 AM)  Sedimentation Rate (09/25/2018 7:48 AM)   Component Value Ref Range   Erythrocyte Sedimentation Rate 46 (H) 0 - 30 mm/hr     Sedimentation Rate (09/25/2018 7:48 AM)   Specimen Performing Laboratory   Blood [facility]    [address] [place] [address]     Back to top of Lab Results       C-Reactive Protein (09/25/2018 7:48 AM)  C-Reactive Protein (09/25/2018 7:48 AM)   Component Value Ref Range   C-Reactive Protein, Quant - [facility] 0.9 0.0 - 4.9 mg/L     C-Reactive Protein (09/25/2018 7:48 AM)   Specimen Performing Laboratory   Blood [facility]       C-Reactive Protein (09/25/2018 7:48 AM)   Narrative   Performed at:  [address] [place] [address]  [id number]    : Srini Quiros MD, Phone:  [phone]     Back to top of Lab Results       Comprehensive Metabolic Panel (09/25/2018 7:48 AM)  Comprehensive Metabolic Panel (09/25/2018 7:48 AM)   Component Value Ref Range   Sodium 140 135 - 146 mmol/L   Potassium 4.3 3.6 - 5.2 mmol/L   Chloride 104 96 - 110 mmol/L   Carbon Dioxide 27 24 - 32 mmol/L   Glucose 124 (H) 65 - 99 mg/dL   BUN 42.0 (H) 7.0 - 25.0 mg/dL   Creatinine 1.95 (H) 0.50 - 1.10 mg/dL   Calcium 9.6 8.4 - 10.3 mg/dL   Total Protein  7.6 6.0 - 8.0 g/dL   Albumin 3.8 3.4 - 5.0 g/dL   AST 33 <45 U/L   ALT 28 <46 U/L   Alkaline Phosphatase 67 20 - 120 U/L   Bilirubin, Total 1.0 <1.3 mg/dL   EGFR, African American 28 (L) >89 mL/min   EGFR, Non African American 24 (L) >89 mL/min     Comprehensive Metabolic Panel (09/25/2018 7:48 AM)   Specimen Performing Laboratory   Blood [facility]    [id number] [address] [address]     Back to top of Lab Results       CBC with Differential (09/25/2018 7:48 AM)  CBC with Differential (09/25/2018 7:48 AM)   Specimen Performing Laboratory   Blood [facility]       CBC with Differential (09/25/2018 7:48 AM)   Narrative   The following orders were created for panel order CBC with Differential.    Procedure                               Abnormality         Status                       ---------                               -----------         ------                       CBC with Differential[[id number]]        Abnormal            Final result                     Please view results for these tests on the individual orders.     Back to top of Lab Results      Visit Diagnoses  - in this encounter      Assessment:       1. PSA (psoriatic arthritis)    2. Psoriasis with pustules    3. Osteoarthritis, unspecified osteoarthritis type, unspecified site    4. Hepatitis C carrier    5. Gouty arthritis    6. Wrist pain, acute, right    7. Carpal tunnel syndrome, bilateral            Plan:        Priscilla was seen today for follow-up.    Diagnoses and all orders for this visit:    PSA (psoriatic arthritis)  -     adalimumab (HUMIRA) 40 mg/0.4 mL SyKt; Inject 0.4 mLs (40 mg total) into the skin every 14 (fourteen) days.  -     Discontinue: clobetasol 0.05% (TEMOVATE) 0.05 % Oint; Apply topically 2 (two) times daily.  -     Discontinue: arm brace (NEOPRENE WRIST SPLINT SUPPORT) Misc; 1 each by Misc.(Non-Drug; Combo Route) route once daily.  -     arm brace (NEOPRENE WRIST SPLINT SUPPORT) Misc; 1 each by  Misc.(Non-Drug; Combo Route) route once daily.  -     CBC auto differential; Future  -     Comprehensive metabolic panel; Future  -     Sedimentation rate; Future  -     C-reactive protein; Future  -     CBC auto differential  -     Comprehensive metabolic panel  -     Sedimentation rate  -     C-reactive protein    Psoriasis with pustules  -     Discontinue: clobetasol 0.05% (TEMOVATE) 0.05 % Oint; Apply topically 2 (two) times daily.  -     CBC auto differential; Future  -     Comprehensive metabolic panel; Future  -     Sedimentation rate; Future  -     C-reactive protein; Future  -     CBC auto differential  -     Comprehensive metabolic panel  -     Sedimentation rate  -     C-reactive protein    Osteoarthritis, unspecified osteoarthritis type, unspecified site  -     Discontinue: arm brace (NEOPRENE WRIST SPLINT SUPPORT) Misc; 1 each by Misc.(Non-Drug; Combo Route) route once daily.  -     arm brace (NEOPRENE WRIST SPLINT SUPPORT) Misc; 1 each by Misc.(Non-Drug; Combo Route) route once daily.  -     CBC auto differential; Future  -     Comprehensive metabolic panel; Future  -     Sedimentation rate; Future  -     C-reactive protein; Future  -     CBC auto differential  -     Comprehensive metabolic panel  -     Sedimentation rate  -     C-reactive protein    Hepatitis C carrier  -     Discontinue: arm brace (NEOPRENE WRIST SPLINT SUPPORT) Misc; 1 each by Misc.(Non-Drug; Combo Route) route once daily.  -     arm brace (NEOPRENE WRIST SPLINT SUPPORT) Misc; 1 each by Misc.(Non-Drug; Combo Route) route once daily.    Gouty arthritis  -     Discontinue: arm brace (NEOPRENE WRIST SPLINT SUPPORT) Misc; 1 each by Misc.(Non-Drug; Combo Route) route once daily.  -     arm brace (NEOPRENE WRIST SPLINT SUPPORT) Misc; 1 each by Misc.(Non-Drug; Combo Route) route once daily.  -     CBC auto differential; Future  -     Comprehensive metabolic panel; Future  -     Sedimentation rate; Future  -     C-reactive protein;  Future  -     CBC auto differential  -     Comprehensive metabolic panel  -     Sedimentation rate  -     C-reactive protein    Wrist pain, acute, right  -     Discontinue: arm brace (NEOPRENE WRIST SPLINT SUPPORT) Misc; 1 each by Misc.(Non-Drug; Combo Route) route once daily.  -     arm brace (NEOPRENE WRIST SPLINT SUPPORT) Misc; 1 each by Misc.(Non-Drug; Combo Route) route once daily.  -     CBC auto differential; Future  -     Comprehensive metabolic panel; Future  -     Sedimentation rate; Future  -     C-reactive protein; Future  -     CBC auto differential  -     Comprehensive metabolic panel  -     Sedimentation rate  -     C-reactive protein    Carpal tunnel syndrome, bilateral  -     Discontinue: arm brace (NEOPRENE WRIST SPLINT SUPPORT) Misc; 1 each by Misc.(Non-Drug; Combo Route) route once daily.  -     arm brace (NEOPRENE WRIST SPLINT SUPPORT) Misc; 1 each by Misc.(Non-Drug; Combo Route) route once daily.  -     CBC auto differential; Future  -     Comprehensive metabolic panel; Future  -     Sedimentation rate; Future  -     C-reactive protein; Future  -     CBC auto differential  -     Comprehensive metabolic panel  -     Sedimentation rate  -     C-reactive protein    Lumbar and sacral spondylarthritis     no change added tramadal refill humira, methotrexate, azulfidine, prednisone Pt power chair is expiring she states that someone is contacting her for another  Model she will call us wit the info.  The patient requires a power mobility device because she is unable to use a cane or walker due due to decreased strength in her upper and lower extremity she is unable to use a manual we wheelchair because of side decrease strength and mobility as well as lack of stability she is unable to use a scooter because she has peripheral neuropathy and she has postural stability.  Patient will safely be able to operate a power mobility device both mentally and physically patient's is willing and motivated to use  the power mobility device in her home.

## 2018-10-29 NOTE — TELEPHONE ENCOUNTER
Returned patient call regarding sooner appointment. Nurse offered appointment today at 3 pm. Patient denied appointment does not have a ride. Will try and contact family member to come to appointment today and will call office back.

## 2018-10-29 NOTE — TELEPHONE ENCOUNTER
----- Message from Tr Young sent at 10/29/2018  8:50 AM CDT -----  Contact: patient  Type:  Patient Returning Call    Who Called:  Priscilla  Who Left Message for Patient:  Sammie  Does the patient know what this is regarding?:  Sooner appointment  Best Call Back Number:  [phone]  Additional Information:  Says she can take the 3 pm appointment for today.

## 2018-10-29 NOTE — TELEPHONE ENCOUNTER
Returned patient call and rescheduled appointment to 3 pm today for evaluation for [facility]es round chair

## 2018-10-30 DIAGNOSIS — L40.8 PSORIASIS WITH PUSTULES: ICD-10-CM

## 2018-10-30 DIAGNOSIS — L40.50 PSA (PSORIATIC ARTHRITIS): ICD-10-CM

## 2018-10-31 RX ORDER — CLOBETASOL PROPIONATE 0.5 MG/G
OINTMENT TOPICAL 2 TIMES DAILY
Qty: 45 G | Refills: 3 | Status: SHIPPED | OUTPATIENT
Start: 2018-10-31 | End: 2019-02-07 | Stop reason: SDUPTHER

## 2018-11-09 RX ORDER — TRAMADOL HYDROCHLORIDE 50 MG/1
TABLET ORAL
Qty: 90 TABLET | Refills: 3 | Status: SHIPPED | OUTPATIENT
Start: 2018-11-09 | End: 2019-07-01 | Stop reason: SDUPTHER

## 2019-01-29 ENCOUNTER — TELEPHONE (OUTPATIENT)
Dept: RHEUMATOLOGY | Facility: CLINIC | Age: 79
End: 2019-01-29

## 2019-01-29 NOTE — TELEPHONE ENCOUNTER
appt scheduled per pt request. Wants same day as podiatry. appt scheduled reminder printed. Pt gave readback of date and time.

## 2019-01-29 NOTE — TELEPHONE ENCOUNTER
----- Message from Frank Landry sent at 1/29/2019  7:21 AM CST -----  Type:  Sooner Apoointment Request    Caller is requesting a sooner appointment.  Caller declined first available appointment listed below.  Caller will not accept being placed on the waitlist and is requesting a message be sent to doctor.    Name of Caller:  Self When is the first available appointment?  No future appointments Symptoms:  NA  Best Call Back Number:  [phone]  Additional Information:

## 2019-02-07 ENCOUNTER — OFFICE VISIT (OUTPATIENT)
Dept: RHEUMATOLOGY | Facility: CLINIC | Age: 79
End: 2019-02-07
Payer: MEDICARE

## 2019-02-07 ENCOUNTER — OFFICE VISIT (OUTPATIENT)
Dept: PODIATRY | Facility: CLINIC | Age: 79
End: 2019-02-07
Payer: MEDICARE

## 2019-02-07 VITALS
BODY MASS INDEX: 35.8 KG/M2 | HEIGHT: 64 IN | DIASTOLIC BLOOD PRESSURE: 72 MMHG | WEIGHT: 209.69 LBS | HEART RATE: 74 BPM | SYSTOLIC BLOOD PRESSURE: 136 MMHG

## 2019-02-07 VITALS — BODY MASS INDEX: 35.81 KG/M2 | WEIGHT: 209.75 LBS | HEIGHT: 64 IN | RESPIRATION RATE: 20 BRPM

## 2019-02-07 DIAGNOSIS — L84 CORN OR CALLUS: ICD-10-CM

## 2019-02-07 DIAGNOSIS — E66.01 SEVERE OBESITY (BMI 35.0-35.9 WITH COMORBIDITY): ICD-10-CM

## 2019-02-07 DIAGNOSIS — M19.90 OSTEOARTHRITIS, UNSPECIFIED OSTEOARTHRITIS TYPE, UNSPECIFIED SITE: ICD-10-CM

## 2019-02-07 DIAGNOSIS — Z79.4 TYPE 2 DIABETES MELLITUS WITHOUT COMPLICATION, WITH LONG-TERM CURRENT USE OF INSULIN: ICD-10-CM

## 2019-02-07 DIAGNOSIS — E11.9 TYPE 2 DIABETES MELLITUS WITHOUT COMPLICATION, WITH LONG-TERM CURRENT USE OF INSULIN: ICD-10-CM

## 2019-02-07 DIAGNOSIS — Z79.899 IMMUNOCOMPROMISED STATE DUE TO DRUG THERAPY: ICD-10-CM

## 2019-02-07 DIAGNOSIS — E11.42 DIABETIC POLYNEUROPATHY ASSOCIATED WITH TYPE 2 DIABETES MELLITUS: Primary | ICD-10-CM

## 2019-02-07 DIAGNOSIS — Z89.432 HISTORY OF TRANSMETATARSAL AMPUTATION OF LEFT FOOT: ICD-10-CM

## 2019-02-07 DIAGNOSIS — L40.50 PSA (PSORIATIC ARTHRITIS): Primary | ICD-10-CM

## 2019-02-07 DIAGNOSIS — D84.821 IMMUNOCOMPROMISED STATE DUE TO DRUG THERAPY: ICD-10-CM

## 2019-02-07 DIAGNOSIS — I73.9 PAD (PERIPHERAL ARTERY DISEASE): ICD-10-CM

## 2019-02-07 DIAGNOSIS — B37.9 YEAST INFECTION: ICD-10-CM

## 2019-02-07 DIAGNOSIS — L40.8 PSORIASIS WITH PUSTULES: ICD-10-CM

## 2019-02-07 DIAGNOSIS — N18.30 CKD (CHRONIC KIDNEY DISEASE) STAGE 3, GFR 30-59 ML/MIN: ICD-10-CM

## 2019-02-07 DIAGNOSIS — M10.9 GOUTY ARTHRITIS: ICD-10-CM

## 2019-02-07 DIAGNOSIS — B35.1 ONYCHOMYCOSIS DUE TO DERMATOPHYTE: ICD-10-CM

## 2019-02-07 PROCEDURE — 11720 PR DEBRIDEMENT OF NAIL(S), 1-5: ICD-10-PCS | Mod: 59,Q9,S$GLB, | Performed by: PODIATRIST

## 2019-02-07 PROCEDURE — 99999 PR PBB SHADOW E&M-EST. PATIENT-LVL V: ICD-10-PCS | Mod: PBBFAC,,, | Performed by: PHYSICIAN ASSISTANT

## 2019-02-07 PROCEDURE — 11055 PARING/CUTG B9 HYPRKER LES 1: CPT | Mod: Q9,S$GLB,, | Performed by: PODIATRIST

## 2019-02-07 PROCEDURE — 99214 OFFICE O/P EST MOD 30 MIN: CPT | Mod: 25,S$GLB,, | Performed by: PHYSICIAN ASSISTANT

## 2019-02-07 PROCEDURE — 99215 OFFICE O/P EST HI 40 MIN: CPT | Mod: PBBFAC,27,PO | Performed by: PHYSICIAN ASSISTANT

## 2019-02-07 PROCEDURE — 99999 PR PBB SHADOW E&M-EST. PATIENT-LVL V: CPT | Mod: PBBFAC,,, | Performed by: PHYSICIAN ASSISTANT

## 2019-02-07 PROCEDURE — 99214 OFFICE O/P EST MOD 30 MIN: CPT | Mod: PBBFAC,PN | Performed by: PODIATRIST

## 2019-02-07 PROCEDURE — 99999 PR PBB SHADOW E&M-EST. PATIENT-LVL IV: CPT | Mod: PBBFAC,,, | Performed by: PODIATRIST

## 2019-02-07 PROCEDURE — 96372 THER/PROPH/DIAG INJ SC/IM: CPT | Mod: PBBFAC,PO,59

## 2019-02-07 PROCEDURE — 99214 PR OFFICE/OUTPT VISIT, EST, LEVL IV, 30-39 MIN: ICD-10-PCS | Mod: 25,S$GLB,, | Performed by: PHYSICIAN ASSISTANT

## 2019-02-07 PROCEDURE — 11720 DEBRIDE NAIL 1-5: CPT | Mod: PBBFAC,PN,59 | Performed by: PODIATRIST

## 2019-02-07 PROCEDURE — 11055 PR TRIM HYPERKERATOTIC SKIN LESION, ONE: ICD-10-PCS | Mod: Q9,S$GLB,, | Performed by: PODIATRIST

## 2019-02-07 PROCEDURE — 99999 PR PBB SHADOW E&M-EST. PATIENT-LVL IV: ICD-10-PCS | Mod: PBBFAC,,, | Performed by: PODIATRIST

## 2019-02-07 PROCEDURE — 99213 PR OFFICE/OUTPT VISIT, EST, LEVL III, 20-29 MIN: ICD-10-PCS | Mod: 25,S$GLB,, | Performed by: PODIATRIST

## 2019-02-07 PROCEDURE — 11055 PARING/CUTG B9 HYPRKER LES 1: CPT | Mod: PBBFAC,PN | Performed by: PODIATRIST

## 2019-02-07 PROCEDURE — 11720 DEBRIDE NAIL 1-5: CPT | Mod: 59,Q9,S$GLB, | Performed by: PODIATRIST

## 2019-02-07 PROCEDURE — 99213 OFFICE O/P EST LOW 20 MIN: CPT | Mod: 25,S$GLB,, | Performed by: PODIATRIST

## 2019-02-07 RX ORDER — METHYLPREDNISOLONE ACETATE 80 MG/ML
80 INJECTION, SUSPENSION INTRA-ARTICULAR; INTRALESIONAL; INTRAMUSCULAR; SOFT TISSUE
Status: COMPLETED | OUTPATIENT
Start: 2019-02-07 | End: 2019-02-07

## 2019-02-07 RX ORDER — TRIAMCINOLONE ACETONIDE 1 MG/G
CREAM TOPICAL
Qty: 80 G | Refills: 1 | Status: SHIPPED | OUTPATIENT
Start: 2019-02-07

## 2019-02-07 RX ORDER — FLUCONAZOLE 100 MG/1
100 TABLET ORAL DAILY
Qty: 3 TABLET | Refills: 0 | Status: SHIPPED | OUTPATIENT
Start: 2019-02-07 | End: 2019-02-10

## 2019-02-07 RX ORDER — ALBUTEROL SULFATE 90 UG/1
2 AEROSOL, METERED RESPIRATORY (INHALATION) EVERY 6 HOURS PRN
COMMUNITY

## 2019-02-07 RX ORDER — CLOBETASOL PROPIONATE 0.5 MG/G
OINTMENT TOPICAL
Qty: 60 G | Refills: 3 | Status: SHIPPED | OUTPATIENT
Start: 2019-02-07 | End: 2019-03-25 | Stop reason: SDUPTHER

## 2019-02-07 RX ADMIN — METHYLPREDNISOLONE ACETATE 80 MG: 80 INJECTION, SUSPENSION INTRA-ARTICULAR; INTRALESIONAL; INTRAMUSCULAR; SOFT TISSUE at 12:02

## 2019-02-07 NOTE — PROGRESS NOTES
Administered 1 cc DepoMedrol 80mg/cc  to right upper outer gluteal. Pt tolerated well. No acute reaction noted to site. Pt instructed on S/S to report. Advised patient to wait in lobby 15 minutes after receiving injection to monitor for any reactions..  Pt verbalized understanding.     Lot: [id number]  Exp: 02/20

## 2019-02-07 NOTE — PROGRESS NOTES
Subjective:       Patient ID: Priscilla Landry is a 78 y.o. female.    Chief Complaint: Follow-up    Mrs. Landry is a 78 year old female who presents to clinic for follow upon psoriatic arthritis and osteoarthritis. She is a new patient to me. She held her last Humira injection after concern for skin infection under the breast (yeast), which she has been applying topical vaseline with no relief. She reports minimal improvement in her joints on Humira. Arthralgias involved including hands, wrists, elbows, shoulders, neck, back and knees. Pain is constant and aching in nature. She complains that her fingers are locking up on her. BG this morning in 80s. No recent labs since November.     Current treatment:  1. Humira SC q 14 days  2. Prednisone 2.5 mg daily  3. Tramadol 50 mg q 8 hours PRN      Review of Systems   Constitutional: Positive for activity change. Negative for appetite change, chills, fatigue and fever.   Eyes: Negative for visual disturbance.   Respiratory: Negative for cough and shortness of breath.    Cardiovascular: Negative for chest pain, palpitations and leg swelling.   Gastrointestinal: Negative for abdominal pain, constipation, diarrhea, nausea and vomiting.   Musculoskeletal: Positive for arthralgias, gait problem and joint swelling.   Skin: Positive for color change and rash.   Neurological: Negative for dizziness, weakness, light-headedness and headaches.   Psychiatric/Behavioral: Negative for dysphoric mood and sleep disturbance. The patient is not nervous/anxious.          Objective:     Vitals:    02/07/19 1130   BP: 136/72   Pulse: 74       Past Medical History:   Diagnosis Date    Cataracts, bilateral     Coronary artery disease     Diabetes mellitus     Glaucoma     Hyperlipidemia     Hypertension     Psoriasis     Type 2 diabetes mellitus      Past Surgical History:   Procedure Laterality Date    CARDIAC CATHETERIZATION      CAROTID STENT      CHOLECYSTECTOMY       HYSTERECTOMY      KIDNEY STONE SURGERY            Physical Exam   Vitals reviewed.  Constitutional:   Obese body habitus.   Eyes: Right conjunctiva is not injected. Left conjunctiva is not injected. Right eye exhibits normal extraocular motion. Left eye exhibits normal extraocular motion.   Neck: No JVD present. No thyromegaly present.   Cardiovascular: Normal rate and regular rhythm.  Exam reveals no decreased pulses.    Pulmonary/Chest: She has no wheezes. She has no rhonchi. She has no rales.       Right Side Rheumatological Exam     The patient is tender to palpation of the shoulder, elbow, wrist, knee, 1st PIP, 1st MCP, 2nd PIP, 2nd MCP, 3rd PIP, 3rd MCP, 4th PIP, 4th MCP, 5th PIP and 5th MCP    She has swelling of the knee, 1st MCP, 2nd PIP, 2nd MCP, 3rd PIP, 3rd MCP, 4th PIP and 5th PIP    Left Side Rheumatological Exam     The patient is tender to palpation of the shoulder, elbow, wrist, knee, 1st PIP, 1st MCP, 2nd PIP, 2nd MCP, 3rd PIP, 3rd MCP, 4th PIP, 4th MCP, 5th PIP and 5th MCP.    She has swelling of the knee, 2nd PIP, 2nd MCP, 3rd PIP and 4th PIP      Lymphadenopathy:     She has no cervical adenopathy.   Neurological: Gait normal.   Skin: Rash (scales on the elbows, erythematous large lesion under both breasts and groin) noted.     Psychiatric: Mood and affect normal.         Assessment:       1. PSA (psoriatic arthritis)    2. Osteoarthritis, unspecified osteoarthritis type, unspecified site    3. Immunocompromised state due to drug therapy    4. Yeast infection    5. Psoriasis with pustules    6. Gouty arthritis    7. Type 2 diabetes mellitus without complication, with long-term current use of insulin    8. CKD (chronic kidney disease) stage 3, GFR 30-59 ml/min    9. Severe obesity (BMI 35.0-35.9 with comorbidity)            Plan:       PSA (psoriatic arthritis)  -     Comprehensive metabolic panel; Future; Expected date: 02/07/2019  -     C-reactive protein; Future; Expected date: 02/07/2019  -      Sedimentation rate; Future; Expected date: 02/07/2019  -     CBC auto differential; Future; Expected date: 02/07/2019  -     Quantiferon Gold TB; Future; Expected date: 02/07/2019  -     Hepatitis B core antibody, IgM; Future; Expected date: 02/07/2019  -     Hepatitis C RNA, quantitative, PCR; Future; Expected date: 02/07/2019  -     methylPREDNISolone acetate injection 80 mg  -     triamcinolone acetonide 0.1% (KENALOG) 0.1 % cream; Apply topically twice daily to affected areas under the breasts and under the abdomen  Dispense: 80 g; Refill: 1  -     clobetasol 0.05% (TEMOVATE) 0.05 % Oint; Apply topically two times daily to extremities (elbows/knees)  Dispense: 60 g; Refill: 3  -     secukinumab (COSENTYX PEN, 2 PENS,) 150 mg/mL PnIj; Inject 300 mg into the skin every 30 days.  Dispense: 2 mL; Refill: 11  -     secukinumab (COSENTYX PEN, 2 PENS,) 150 mg/mL PnIj; Inject 300 mg into the skin once weekly at weeks 0, 1, 2, 3, and 4 followed by 300 mg every 4 weeks  Dispense: 10 mL; Refill: 0    Osteoarthritis, unspecified osteoarthritis type, unspecified site  -     Comprehensive metabolic panel; Future; Expected date: 02/07/2019  -     C-reactive protein; Future; Expected date: 02/07/2019  -     Sedimentation rate; Future; Expected date: 02/07/2019  -     CBC auto differential; Future; Expected date: 02/07/2019  -     Quantiferon Gold TB; Future; Expected date: 02/07/2019  -     Hepatitis B core antibody, IgM; Future; Expected date: 02/07/2019  -     Hepatitis C RNA, quantitative, PCR; Future; Expected date: 02/07/2019    Immunocompromised state due to drug therapy  -     Comprehensive metabolic panel; Future; Expected date: 02/07/2019  -     C-reactive protein; Future; Expected date: 02/07/2019  -     Sedimentation rate; Future; Expected date: 02/07/2019  -     CBC auto differential; Future; Expected date: 02/07/2019  -     Quantiferon Gold TB; Future; Expected date: 02/07/2019  -     Hepatitis B core antibody,  IgM; Future; Expected date: 02/07/2019  -     Hepatitis C RNA, quantitative, PCR; Future; Expected date: 02/07/2019    Yeast infection  -     fluconazole (DIFLUCAN) 100 MG tablet; Take 1 tablet (100 mg total) by mouth once daily. for 3 days  Dispense: 3 tablet; Refill: 0  -     Ambulatory consult to Dermatology    Psoriasis with pustules  -     secukinumab (COSENTYX PEN, 2 PENS,) 150 mg/mL PnIj; Inject 300 mg into the skin every 30 days.  Dispense: 2 mL; Refill: 11  -     secukinumab (COSENTYX PEN, 2 PENS,) 150 mg/mL PnIj; Inject 300 mg into the skin once weekly at weeks 0, 1, 2, 3, and 4 followed by 300 mg every 4 weeks  Dispense: 10 mL; Refill: 0    Gouty arthritis  -     Uric acid; Future; Expected date: 02/07/2019    Type 2 diabetes mellitus without complication, with long-term current use of insulin    CKD (chronic kidney disease) stage 3, GFR 30-59 ml/min    Severe obesity (BMI 35.0-35.9 with comorbidity)        Assessment:  78 year old female with  Psoriatic arthritis, osteoarthritis on Humira SC q 14 days  --type 2 diabetes (HgbA1c 7.2% 9/18), CKD, severe obesity    Plan: Case discussed with Dr. Henson. Assessment and Plan done in collaboration    1. Check labs soon including TB, hep b/c  2. D/C Humira  3. Start cosentyx 300 mg wk 0,1,2,3,4 then every 4 weeks thereafter. RX sent to [facility].  4. Refer to Dermatology, pt refused to schedule eval at this time  5. Start diflucan 100 mg daily x 3 days. 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  6. Continue temovate cream on extremities PRN  7. Follow up with PCP for type 2 diabetes and obesity     Follow up: 3 months with Dr. henson

## 2019-02-08 ENCOUNTER — TELEPHONE (OUTPATIENT)
Dept: PHARMACY | Facility: CLINIC | Age: 79
End: 2019-02-08

## 2019-02-08 NOTE — PROGRESS NOTES
Subjective:      Patient ID: Priscilla Landry is a 78 y.o. female.    Chief Complaint: Diabetic Foot Exam (PCP:  per patient seen 1/1/19  A1C: couple weeks ago pt doesn't know lab value) and Diabetes Mellitus (3 month )    Priscilla is a 78 y.o. female who presents to the clinic for routine evaluation and treatment of diabetic feet. Priscilla has a past medical history of Cataracts, bilateral, Coronary artery disease, Diabetes mellitus, Glaucoma, Hyperlipidemia, Hypertension, Psoriasis, and Type 2 diabetes mellitus. Patient denies major issues with the lower extremities with today's exam.  Continues to develop thick callus build up about the TMA stump site.  Denies this being painful.  Has been treating by applying moisturizer to the site daily.  Also, relates having toenails in need of trimming.  Denies any additional pedal complaints.      PCP: Jonna Contreras MD    Date Last Seen by PCP: 1/1/19    No results found for: HGBA1C          Past Medical History:   Diagnosis Date    Cataracts, bilateral     Coronary artery disease     Diabetes mellitus     Glaucoma     Hyperlipidemia     Hypertension     Psoriasis     Type 2 diabetes mellitus        Past Surgical History:   Procedure Laterality Date    CARDIAC CATHETERIZATION      CAROTID STENT      CHOLECYSTECTOMY      HYSTERECTOMY      KIDNEY STONE SURGERY         Family History   Problem Relation Age of Onset    Hypertension Mother     Heart disease Mother     Diabetes Mellitus Mother        Social History     Socioeconomic History    Marital status:      Spouse name: None    Number of children: None    Years of education: None    Highest education level: None   Social Needs    Financial resource strain: None    Food insecurity - worry: None    Food insecurity - inability: None    Transportation needs - medical: None    Transportation needs - non-medical: None   Occupational History    None   Tobacco Use    Smoking status: Never  Smoker    Smokeless tobacco: Never Used   Substance and Sexual Activity    Alcohol use: No    Drug use: No    Sexual activity: None   Other Topics Concern    None   Social History Narrative    None       Current Outpatient Medications   Medication Sig Dispense Refill    allopurinol (ZYLOPRIM) 300 MG tablet Take 300 mg by mouth once daily.      arm brace (NEOPRENE WRIST SPLINT SUPPORT) Misc 1 each by Misc.(Non-Drug; Combo Route) route once daily. 2 each 0    aspirin (ECOTRIN) 325 MG EC tablet Take 325 mg by mouth once daily.      atorvastatin (LIPITOR) 40 MG tablet Take 40 mg by mouth every evening.      calcitRIOL (ROCALTROL) 0.5 MCG Cap Take 0.5 mcg by mouth once daily.      calcium carbonate (OS-ANGIE) 600 mg calcium (1,500 mg) Tab Take 600 mg by mouth once. Take one tablet at noon      clopidogrel (PLAVIX) 75 mg tablet Take 75 mg by mouth once daily.      DULoxetine (CYMBALTA) 30 MG capsule Take 1 capsule (30 mg total) by mouth every evening. In the pm 30 capsule 12    furosemide (LASIX) 20 MG tablet Take 20 mg by mouth 2 (two) times daily.      gabapentin (NEURONTIN) 400 MG capsule Take 400 mg by mouth every evening.      insulin glargine (LANTUS) 100 unit/mL injection Inject 90 Units into the skin.      insulin NPH (NOVOLIN N NPH U-100 INSULIN) 100 unit/mL injection INJECT 35 UNITS SUBCUTANEOUSLY BEFORE BREAKFAST AND 35 UNITS SUBCUTANEOUSLY BEFORE LUNCH *THANK YOU*      lancets (TRUEPLUS LANCETS) 28 gauge Misc USE AS DIRECTED 3 TIMES DAILY *THANK YOU*      levothyroxine (SYNTHROID) 25 MCG tablet Take 25 mcg by mouth before breakfast.      loratadine (CLARITIN) 10 mg tablet TAKE 1 TABLET AT BEDTIME FOR SINUS AND ALLERGIES *THANK YOU*      metoprolol tartrate (LOPRESSOR) 25 MG tablet Take 25 mg by mouth 2 (two) times daily.      multivitamin with minerals tablet Take 1 tablet by mouth once daily. Take 1 tablet at noon      omeprazole (PRILOSEC) 20 MG capsule Take 20 mg by mouth once daily.       predniSONE (DELTASONE) 2.5 MG tablet Take 1 tablet (2.5 mg total) by mouth once daily. 90 tablet 3    traMADol (ULTRAM) 50 mg tablet Take 1 tablet (50 mg total) by mouth every 8 (eight) hours as needed. 90 tablet 3    albuterol (VENTOLIN HFA) 90 mcg/actuation inhaler Inhale 2 puffs into the lungs every 6 (six) hours as needed for Wheezing. Rescue      clobetasol 0.05% (TEMOVATE) 0.05 % Oint Apply topically two times daily to extremities (elbows/knees) 60 g 3    fluconazole (DIFLUCAN) 100 MG tablet Take 1 tablet (100 mg total) by mouth once daily. for 3 days 3 tablet 0    secukinumab (COSENTYX PEN, 2 PENS,) 150 mg/mL PnIj Inject 300 mg into the skin every 30 days. 2 mL 11    secukinumab (COSENTYX PEN, 2 PENS,) 150 mg/mL PnIj Inject 300 mg into the skin once weekly at weeks 0, 1, 2, 3, and 4 followed by 300 mg every 4 weeks 10 mL 0    triamcinolone acetonide 0.1% (KENALOG) 0.1 % cream Apply topically twice daily to affected areas under the breasts and under the abdomen 80 g 1     No current facility-administered medications for this visit.        Review of patient's allergies indicates:   Allergen Reactions    Strawberry Hives    Sulfa (sulfonamide antibiotics) Swelling     Unknown^         Review of Systems   Constitution: Negative for chills and fever.   Cardiovascular: Positive for leg swelling. Negative for claudication.   Skin: Positive for color change and nail changes.   Musculoskeletal: Positive for arthritis, joint pain and joint swelling. Negative for muscle cramps and muscle weakness.   Neurological: Positive for numbness and paresthesias.   Psychiatric/Behavioral: Negative for altered mental status.           Objective:      Physical Exam   Constitutional: She is oriented to person, place, and time. She appears well-developed and well-nourished. No distress.   Cardiovascular:   Pulses:       Dorsalis pedis pulses are 1+ on the right side, and 1+ on the left side.        Posterior tibial pulses  are 1+ on the right side, and 1+ on the left side.   CFT is 3 seconds on the Rt.  Pedal hair growth is absent bilateral.  Varicosities noted bilateral.  Moderate non pitting edema noted to bilateral lower extremity.  Toes are cool to touch on the Rt.   Musculoskeletal: She exhibits edema. She exhibits no tenderness.   Muscle strength 5/5 in all muscle groups bilateral.  No tenderness nor crepitation with ROM of foot/ankle joints bilateral.  No tenderness with palpation of bilateral foot and ankle.  Bilateral pes planus foot type.  Lt. TMA.  Rt. Hallux abducto valgus.  Rt. Semi-rigid contracture of toes 2-5.   Neurological: She is alert and oriented to person, place, and time. She has normal strength. A sensory deficit is present.   Protective sensation per [place] monofilament is decreased bilateral.  Vibratory sensation is absent to the level of the malleoli bilateral.  Light touch is intact bilateral.   Skin: Skin is warm and dry. Capillary refill takes 2 to 3 seconds. Lesion noted. No abrasion, no bruising, no burn, no ecchymosis, no petechiae and no rash noted. She is not diaphoretic. No erythema. No pallor.   Pedal skin appears edematous bilateral.  Toenails x 5 appear thickened by 2 mm's, elongated by 4 mm's, and discolored with subungual debris.  Hyperkeratotic lesion noted to the Lt. TMA stump site.  Examination of the skin reveals no evidence of significant maceration, rashes, open lesions, suspicious appearing nevi or other concerning lesions.              Assessment:       Encounter Diagnoses   Name Primary?    Diabetic polyneuropathy associated with type 2 diabetes mellitus Yes    History of transmetatarsal amputation of left foot     PAD (peripheral artery disease)     Corn or callus     Onychomycosis due to dermatophyte          Plan:       Priscilla was seen today for diabetic foot exam and diabetes mellitus.    Diagnoses and all orders for this visit:    Diabetic polyneuropathy associated  with type 2 diabetes mellitus    History of transmetatarsal amputation of left foot    PAD (peripheral artery disease)    Corn or callus    Onychomycosis due to dermatophyte      I counseled the patient on her conditions, their implications and medical management.    Advised to continue wearing shoe gear that accommodates foot deformities.    PAD remains stable with today's visit.  Will continue to monitor.    Shoe inspection. Diabetic Foot Education. Patient reminded of the importance of good nutrition and blood sugar control to help prevent podiatric complications of diabetes. Patient instructed on proper foot hygeine. We discussed wearing proper shoe gear, daily foot inspections, never walking without protective shoe gear, never putting sharp instruments to feet    With patient's permission, nails were aggressively reduced and debrided x 5 to their soft tissue attachment mechanically and with electric , removing all offending nail and debris.  Also, a sterile #15 scalpel was used to trim the lesion x 1 down to smooth appearing skin without incident.  Patient relates relief following the procedure. She will continue to monitor the areas daily, inspect her feet, wear protective shoe gear when ambulatory, moisturizer to maintain skin integrity and follow in this office in approximately 2-3 months, sooner p.r.n.    Follow-up in about 3 months (around 5/7/2019).    Tremaine Carter DPM

## 2019-02-12 ENCOUNTER — TELEPHONE (OUTPATIENT)
Dept: RHEUMATOLOGY | Facility: CLINIC | Age: 79
End: 2019-02-12

## 2019-02-12 NOTE — TELEPHONE ENCOUNTER
----- Message from Farzana Kaminski sent at 2/12/2019  4:21 PM CST -----  Type:  Pharmacy Calling to Clarify an RX    Name of Caller: José  Pharmacy Name:    [facility]   Prescription Name:  secukinumab (COSENTYX PEN, 2 PENS,) 150 mg/mL PnIj  What do they need to clarify?:  Regarding prior auth information, has some additional clinical questions  Best Call Back Number:  [phone]  Additional Information:

## 2019-02-12 NOTE — TELEPHONE ENCOUNTER
Documentation Only:    Faxed Prior Authorization form and supporting documentation for Cosentyx to insurance on 02/12/2019.

## 2019-02-13 ENCOUNTER — TELEPHONE (OUTPATIENT)
Dept: RHEUMATOLOGY | Facility: CLINIC | Age: 79
End: 2019-02-13

## 2019-02-13 ENCOUNTER — TELEPHONE (OUTPATIENT)
Dept: PHARMACY | Facility: CLINIC | Age: 79
End: 2019-02-13

## 2019-02-13 NOTE — TELEPHONE ENCOUNTER
Documentation Only:    Prior Authorization for Cosentyx has been approved for two years.    Approval dates:  02/12/2019 through 02/12/2021    Patient co-pay: $8.50

## 2019-02-13 NOTE — TELEPHONE ENCOUNTER
Initial Cosentyx SensoReady Pens 150mg consult completed on 19. Cosentyx SensoReady Pens 150mg will be shipped on  to arrive at patient's home on 2/15 via [employer]. $8.50 copay. Patient will start Cosentyx SensoReady Pens 150mg on 2/15. Address confirmed, CC on file. Confirmed 2 patient identifiers - name and . Therapy Appropriate.    - Cosentyx SensoReady Pens 150mg:  Inject 2 pens (300mg) every week x 5 weeks, then 2 pens (300mg) every 4 weeks.    -Storage: Refrigerate between 36-46 degrees Fahrenheit  Use within ONE HOUR of taking out of fridge.    -Injection technique:  - Wash hands before and after injection.  - Monthly RX will come with gauze, bandaids, and alcohol swabs.  - Patient may inject in either the tops of the thighs, abdomen- but at least 2 inches away from her belly button.   - Patient was instructed to rotate injections sites.  - Examine device to ensure no particulates, cloudiness, etc.  - Patient is to wipe down the injection site with the alcohol pad, wait to dry. Gently squeeze the area of the cleaned skin and hold it firmly. Place the pen flat at a 90 degree angle against the raised area of skin that is being squeezed, and then push down firmly on the pen to start the injection. The 1st 'click' indicates the start and the second 'click' indicates that the injection is almost complete. A green indicator will fill the window when completed, and pen can be removed.  - Patient will use sharps container; once full, per [place] law, she may lock the sharps container and place in her trash. She can then contact the Pharmacy and we will replace the sharps at no additional charge.    -Potential Side effects:  Injection site reactions, diarrhea, cold like symptoms.  Patient advised to call if any signs of allergic reaction, infection, or use of live vaccines.    -DDI: Medication list reviewed and potential DDIs addressed.  Patient verbalized understanding. Compliance stressed. Patient advised to  keep a calendar marking dates of injections to ensure better compliance. Patient advised to call myself or provider should any questions arise. Patient plans to start Cosentyx SensoReady Pens on Friday 215/19. Consultation included: indication; goals of treatment; administration; storage and handling; side effects; how to handle side effects; the importance of compliance; how to handle missed doses; the importance of laboratory monitoring; the importance of keeping all follow up appointments. Patient understands to report any medication changes to [facility] and provider. All questions answered and addressed to patients satisfaction. I will f/u with patient in 1 week from start, [facility] to contact patient in 3 weeks for refills.    Zena Valles, PharmD  [facility]  [phone]

## 2019-02-13 NOTE — TELEPHONE ENCOUNTER
----- Message from Zena Valles PharmD sent at 2/13/2019  3:46 PM CST -----  Regarding: TB labs  Good afternoon,    We are sending Ms. Landry her Cosentyx for delivery Friday, she was not able to let me know her last negative TB or if she has plans for labs. I know she was previously on Humira and has outside labs ordered, do you have a negative TB test on file?    Thank you,  Zena Valles, PharmD  [facility]  [phone]

## 2019-02-13 NOTE — TELEPHONE ENCOUNTER
Please find out when she is planning to complete labs that I ordered last week. We need these labs prior to her starting cosentyx. How is her rash doing? If not improving then she please encourage her to set up dermatology appointment.

## 2019-02-14 ENCOUNTER — DOCUMENTATION ONLY (OUTPATIENT)
Dept: RHEUMATOLOGY | Facility: CLINIC | Age: 79
End: 2019-02-14

## 2019-02-14 NOTE — TELEPHONE ENCOUNTER
Contacted patient regarding TB test. Patient stated she will need order faxed to [facility] because it is close to her home. Patient will be unable to come to office for first injection of cosentyx due to transportation issues. Nurse asked patient if someone was available to be with her when she administers first injection and how far from hospital. Patient stated son or daughter will be with her and she is about 13 miles from hospital. Explained possible side effects or reactions to watch for when administering medication. Patient verbalized understanding and will have TB test done tomorrow.

## 2019-02-14 NOTE — TELEPHONE ENCOUNTER
Can you have her schedule the labs I ordered at her last visit? Let her know the special[facility] pharmacy needs this information to process her new medication.

## 2019-02-14 NOTE — TELEPHONE ENCOUNTER
----- Message from Zena Valles PharmD sent at 2/13/2019  3:46 PM CST -----  Regarding: TB labs  Good afternoon,    We are sending Ms. Landry her Cosentyx for delivery Friday, she was not able to let me know her last negative TB or if she has plans for labs. I know she was previously on Humira and has outside labs ordered, do you have a negative TB test on file?    Thank you,  Zena Valles, PharmD  [facility]  [phone]

## 2019-02-18 ENCOUNTER — TELEPHONE (OUTPATIENT)
Dept: RHEUMATOLOGY | Facility: CLINIC | Age: 79
End: 2019-02-18

## 2019-02-18 NOTE — TELEPHONE ENCOUNTER
----- Message from Zuleyma Cesar sent at 2/18/2019 10:52 AM CST -----  Type: Needs Medical Advice    Who Called:  Patient   Best Call Back Number: [phone]  Additional Information: patient states she had her TB test preformed and it came back ok, she will begin taking the new medication prescribed. COSENTYX today.    Thank you

## 2019-03-07 ENCOUNTER — TELEPHONE (OUTPATIENT)
Dept: PHARMACY | Facility: CLINIC | Age: 79
End: 2019-03-07

## 2019-03-25 DIAGNOSIS — L40.50 PSA (PSORIATIC ARTHRITIS): ICD-10-CM

## 2019-03-25 NOTE — TELEPHONE ENCOUNTER
----- Message from Laquita Sifuentes sent at 3/25/2019 12:24 PM CDT -----  Contact: PT  Pt calling states needing refills on her clobetasol 0.05% (TEMOVATE) 0.05 % Oint 60 grams and triamcinolone acetonide 0.1% (KENALOG) 0.1 % cream 80 grams. Pt is [phone]           .  [facility] - [address] [address]  Phone: [phone] Fax: [phone]

## 2019-03-26 RX ORDER — CLOBETASOL PROPIONATE 0.5 MG/G
OINTMENT TOPICAL
Qty: 60 G | Refills: 3 | Status: SHIPPED | OUTPATIENT
Start: 2019-03-26 | End: 2019-09-30 | Stop reason: SDUPTHER

## 2019-04-05 ENCOUNTER — TELEPHONE (OUTPATIENT)
Dept: PHARMACY | Facility: CLINIC | Age: 79
End: 2019-04-05

## 2019-05-09 ENCOUNTER — TELEPHONE (OUTPATIENT)
Dept: PHARMACY | Facility: CLINIC | Age: 79
End: 2019-05-09

## 2019-05-28 ENCOUNTER — DOCUMENTATION ONLY (OUTPATIENT)
Dept: RHEUMATOLOGY | Facility: CLINIC | Age: 79
End: 2019-05-28

## 2019-05-28 NOTE — PROGRESS NOTES
Received request to change in prescription request due to cost. Dr Farias approved change to clobetasol propionate ointment and signed request. Faxed to [facility] at [phone] sent request to scan

## 2019-06-06 ENCOUNTER — TELEPHONE (OUTPATIENT)
Dept: PHARMACY | Facility: CLINIC | Age: 79
End: 2019-06-06

## 2019-07-01 RX ORDER — TRAMADOL HYDROCHLORIDE 50 MG/1
TABLET ORAL
Qty: 90 TABLET | Refills: 0 | Status: SHIPPED | OUTPATIENT
Start: 2019-07-01 | End: 2019-07-31

## 2019-07-05 ENCOUNTER — TELEPHONE (OUTPATIENT)
Dept: PHARMACY | Facility: CLINIC | Age: 79
End: 2019-07-05

## 2019-07-09 ENCOUNTER — OFFICE VISIT (OUTPATIENT)
Dept: PODIATRY | Facility: CLINIC | Age: 79
End: 2019-07-09
Payer: MEDICARE

## 2019-07-09 VITALS
HEIGHT: 64 IN | WEIGHT: 198.5 LBS | BODY MASS INDEX: 33.89 KG/M2 | DIASTOLIC BLOOD PRESSURE: 58 MMHG | HEART RATE: 67 BPM | SYSTOLIC BLOOD PRESSURE: 108 MMHG

## 2019-07-09 DIAGNOSIS — I73.9 PAD (PERIPHERAL ARTERY DISEASE): ICD-10-CM

## 2019-07-09 DIAGNOSIS — M20.41 HAMMER TOE OF RIGHT FOOT: ICD-10-CM

## 2019-07-09 DIAGNOSIS — Z89.432 HISTORY OF TRANSMETATARSAL AMPUTATION OF LEFT FOOT: ICD-10-CM

## 2019-07-09 DIAGNOSIS — E11.42 DIABETIC POLYNEUROPATHY ASSOCIATED WITH TYPE 2 DIABETES MELLITUS: Primary | ICD-10-CM

## 2019-07-09 DIAGNOSIS — B35.1 ONYCHOMYCOSIS DUE TO DERMATOPHYTE: ICD-10-CM

## 2019-07-09 DIAGNOSIS — L84 CORN OR CALLUS: ICD-10-CM

## 2019-07-09 PROCEDURE — 99499 NO LOS: ICD-10-PCS | Mod: S$GLB,,, | Performed by: PODIATRIST

## 2019-07-09 PROCEDURE — 99999 PR PBB SHADOW E&M-EST. PATIENT-LVL IV: CPT | Mod: PBBFAC,,, | Performed by: PODIATRIST

## 2019-07-09 PROCEDURE — 99999 PR PBB SHADOW E&M-EST. PATIENT-LVL IV: ICD-10-PCS | Mod: PBBFAC,,, | Performed by: PODIATRIST

## 2019-07-09 PROCEDURE — 99499 UNLISTED E&M SERVICE: CPT | Mod: S$GLB,,, | Performed by: PODIATRIST

## 2019-07-09 PROCEDURE — 11055 PR TRIM HYPERKERATOTIC SKIN LESION, ONE: ICD-10-PCS | Mod: Q9,S$GLB,, | Performed by: PODIATRIST

## 2019-07-09 PROCEDURE — 11720 PR DEBRIDEMENT OF NAIL(S), 1-5: ICD-10-PCS | Mod: 59,Q9,S$GLB, | Performed by: PODIATRIST

## 2019-07-09 PROCEDURE — 11720 DEBRIDE NAIL 1-5: CPT | Mod: 59,Q9,S$GLB, | Performed by: PODIATRIST

## 2019-07-09 PROCEDURE — 11055 PARING/CUTG B9 HYPRKER LES 1: CPT | Mod: Q9,S$GLB,, | Performed by: PODIATRIST

## 2019-07-09 NOTE — PROGRESS NOTES
Subjective:      Patient ID: Priscilla Landry is a 79 y.o. female.    Chief Complaint: Diabetes Mellitus (PCP:  Dr Montague 5/31/19; HgbA1c: ??); Follow-up (3 mo ); Foot Pain (ayleen); and Routine Foot Care    Priscilla is a 79 y.o. female who presents to the clinic for routine evaluation and treatment of diabetic feet. Pirscilla has a past medical history of Cataracts, bilateral, Coronary artery disease, Diabetes mellitus, Glaucoma, Hyperlipidemia, Hypertension, Psoriasis, and Type 2 diabetes mellitus. Patient denies major issues with the lower extremities with today's exam.  Continues to develop thick callus build up about the TMA stump site.  Denies this being painful.  Has been treating by applying moisturizer to the site daily.  Also, relates having toenails in need of trimming.  Also, notes having continued neuropathy pain in the feet that occurs nocturnally.  States this lasts for a short period of time and resolves on its own.  Has not attempted to self treat.  Denies this being a new issue.  Denies any additional pedal complaints.      PCP: Jonna Contreras MD    Date Last Seen by PCP: 5/31/19    No results found for: HGBA1C    Past Medical History:   Diagnosis Date    Cataracts, bilateral     Coronary artery disease     Diabetes mellitus     Glaucoma     Hyperlipidemia     Hypertension     Psoriasis     Type 2 diabetes mellitus        Past Surgical History:   Procedure Laterality Date    CARDIAC CATHETERIZATION      CAROTID STENT      CHOLECYSTECTOMY      HYSTERECTOMY      KIDNEY STONE SURGERY         Family History   Problem Relation Age of Onset    Hypertension Mother     Heart disease Mother     Diabetes Mellitus Mother        Social History     Socioeconomic History    Marital status:      Spouse name: Not on file    Number of children: Not on file    Years of education: Not on file    Highest education level: Not on file   Occupational History    Not on file   Social Needs    Financial  resource strain: Not on file    Food insecurity:     Worry: Not on file     Inability: Not on file    Transportation needs:     Medical: Not on file     Non-medical: Not on file   Tobacco Use    Smoking status: Never Smoker    Smokeless tobacco: Never Used   Substance and Sexual Activity    Alcohol use: No    Drug use: No    Sexual activity: Not on file   Lifestyle    Physical activity:     Days per week: Not on file     Minutes per session: Not on file    Stress: Not on file   Relationships    Social connections:     Talks on phone: Not on file     Gets together: Not on file     Attends Restoration service: Not on file     Active member of club or organization: Not on file     Attends meetings of clubs or organizations: Not on file     Relationship status: Not on file   Other Topics Concern    Not on file   Social History Narrative    Not on file       Current Outpatient Medications   Medication Sig Dispense Refill    albuterol (VENTOLIN HFA) 90 mcg/actuation inhaler Inhale 2 puffs into the lungs every 6 (six) hours as needed for Wheezing. Rescue      allopurinol (ZYLOPRIM) 300 MG tablet Take 300 mg by mouth once daily.      arm brace (NEOPRENE WRIST SPLINT SUPPORT) Misc 1 each by Misc.(Non-Drug; Combo Route) route once daily. 2 each 0    aspirin (ECOTRIN) 325 MG EC tablet Take 325 mg by mouth once daily.      atorvastatin (LIPITOR) 40 MG tablet Take 40 mg by mouth every evening.      calcitRIOL (ROCALTROL) 0.5 MCG Cap Take 0.5 mcg by mouth once daily.      calcium carbonate (OS-ANGIE) 600 mg calcium (1,500 mg) Tab Take 600 mg by mouth once. Take one tablet at noon      clobetasol 0.05% (TEMOVATE) 0.05 % Oint Apply topically two times daily to extremities (elbows/knees) 60 g 3    clopidogrel (PLAVIX) 75 mg tablet Take 75 mg by mouth once daily.      DULoxetine (CYMBALTA) 30 MG capsule Take 1 capsule (30 mg total) by mouth every evening. In the pm 30 capsule 12    furosemide (LASIX) 20 MG tablet  Take 20 mg by mouth 2 (two) times daily.      gabapentin (NEURONTIN) 400 MG capsule Take 400 mg by mouth every evening.      insulin glargine (LANTUS) 100 unit/mL injection Inject 90 Units into the skin.      insulin NPH (NOVOLIN N NPH U-100 INSULIN) 100 unit/mL injection INJECT 35 UNITS SUBCUTANEOUSLY BEFORE BREAKFAST AND 35 UNITS SUBCUTANEOUSLY BEFORE LUNCH *THANK YOU*      lancets (TRUEPLUS LANCETS) 28 gauge Misc USE AS DIRECTED 3 TIMES DAILY *THANK YOU*      levothyroxine (SYNTHROID) 25 MCG tablet Take 25 mcg by mouth before breakfast.      loratadine (CLARITIN) 10 mg tablet TAKE 1 TABLET AT BEDTIME FOR SINUS AND ALLERGIES *THANK YOU*      metoprolol tartrate (LOPRESSOR) 25 MG tablet Take 25 mg by mouth 2 (two) times daily.      multivitamin with minerals tablet Take 1 tablet by mouth once daily. Take 1 tablet at noon      omeprazole (PRILOSEC) 20 MG capsule Take 20 mg by mouth once daily.      predniSONE (DELTASONE) 2.5 MG tablet Take 1 tablet (2.5 mg total) by mouth once daily. 90 tablet 3    secukinumab (COSENTYX PEN, 2 PENS,) 150 mg/mL PnIj Inject 300 mg into the skin every 30 days. 2 mL 11    traMADol (ULTRAM) 50 mg tablet TAKE 1 TABLET EVERY 8 HOURS AS NEEDED FOR PAIN *THANK YOU* 90 tablet 0    triamcinolone acetonide 0.1% (KENALOG) 0.1 % cream Apply topically twice daily to affected areas under the breasts and under the abdomen 80 g 1     No current facility-administered medications for this visit.        Review of patient's allergies indicates:   Allergen Reactions    Strawberry Hives    Sulfa (sulfonamide antibiotics) Swelling     Unknown^         Review of Systems   Constitution: Negative for chills and fever.   Cardiovascular: Positive for leg swelling. Negative for claudication.   Skin: Positive for color change and nail changes.   Musculoskeletal: Positive for arthritis, joint pain and joint swelling. Negative for muscle cramps and muscle weakness.   Neurological: Positive for  numbness and paresthesias.   Psychiatric/Behavioral: Negative for altered mental status.           Objective:      Physical Exam   Constitutional: She is oriented to person, place, and time. She appears well-developed and well-nourished. No distress.   Cardiovascular:   Pulses:       Dorsalis pedis pulses are 1+ on the right side, and 1+ on the left side.        Posterior tibial pulses are 1+ on the right side, and 1+ on the left side.   CFT is 3 seconds on the Rt.  Pedal hair growth is absent bilateral.  Varicosities noted bilateral.  Moderate non pitting edema noted to bilateral lower extremity.  Toes are cool to touch on the Rt.   Musculoskeletal: She exhibits edema. She exhibits no tenderness.   Muscle strength 5/5 in all muscle groups bilateral.  No tenderness nor crepitation with ROM of foot/ankle joints bilateral.  No tenderness with palpation of bilateral foot and ankle.  Bilateral pes planus foot type.  Lt. TMA.  Rt. Hallux abducto valgus.  Rt. Semi-rigid contracture of toes 2-5.   Neurological: She is alert and oriented to person, place, and time. She has normal strength. A sensory deficit is present.   Protective sensation per Steele-Radha monofilament is decreased bilateral.  Vibratory sensation is absent to the level of the malleoli bilateral.  Light touch is intact bilateral.   Skin: Skin is warm and dry. Capillary refill takes 2 to 3 seconds. Lesion noted. No abrasion, no bruising, no burn, no ecchymosis, no petechiae and no rash noted. She is not diaphoretic. No erythema. No pallor.   Pedal skin appears edematous bilateral.  Toenails x 5 appear thickened by 2 mm's, elongated by 5 mm's, and discolored with subungual debris.  Hyperkeratotic lesion noted to the Lt. TMA stump site.  Examination of the skin reveals no evidence of significant maceration, rashes, open lesions, suspicious appearing nevi or other concerning lesions.              Assessment:       Encounter Diagnoses   Name Primary?     Diabetic polyneuropathy associated with type 2 diabetes mellitus Yes    PAD (peripheral artery disease)     History of transmetatarsal amputation of left foot     Corn or callus     Hammer toe of right foot     Onychomycosis due to dermatophyte          Plan:       Priscilla was seen today for diabetes mellitus, follow-up, foot pain and routine foot care.    Diagnoses and all orders for this visit:    Diabetic polyneuropathy associated with type 2 diabetes mellitus    PAD (peripheral artery disease)    History of transmetatarsal amputation of left foot    Corn or callus    Hammer toe of right foot    Onychomycosis due to dermatophyte      I counseled the patient on her conditions, their implications and medical management.    Will consider increasing gabapentin dosage should neuropathy pain symptoms worsen.    Advised to continue wearing shoe gear that accommodates foot deformities.    PAD remains stable with today's visit.  Will continue to monitor.    Shoe inspection. Diabetic Foot Education. Patient reminded of the importance of good nutrition and blood sugar control to help prevent podiatric complications of diabetes. Patient instructed on proper foot hygeine. We discussed wearing proper shoe gear, daily foot inspections, never walking without protective shoe gear, never putting sharp instruments to feet    With patient's permission, nails were aggressively reduced and debrided x 5 to their soft tissue attachment mechanically and with electric , removing all offending nail and debris.  Also, a sterile #15 scalpel was used to trim the lesion x 1 down to smooth appearing skin without incident.  Patient relates relief following the procedure. She will continue to monitor the areas daily, inspect her feet, wear protective shoe gear when ambulatory, moisturizer to maintain skin integrity and follow in this office in approximately 2-3 months, sooner p.r.n.    Follow up in about 3 months (around  10/9/2019).    Tremaine Carter DPM

## 2019-08-05 ENCOUNTER — TELEPHONE (OUTPATIENT)
Dept: PHARMACY | Facility: CLINIC | Age: 79
End: 2019-08-05

## 2019-09-16 ENCOUNTER — TELEPHONE (OUTPATIENT)
Dept: PHARMACY | Facility: CLINIC | Age: 79
End: 2019-09-16

## 2019-09-16 NOTE — TELEPHONE ENCOUNTER
Valeria confirmed shipping of Cosentyx on  to arrive . Address and  verified. $0 copay in 004. Patient stated she injects on  every month. Patient plans to inject , upon arrival, and resume to the 15th monthly. Patient reported no new medications. Pt requested a new sharps. Patient had no further questions or concerns.

## 2019-09-30 ENCOUNTER — OFFICE VISIT (OUTPATIENT)
Dept: RHEUMATOLOGY | Facility: CLINIC | Age: 79
End: 2019-09-30
Payer: MEDICARE

## 2019-09-30 VITALS — BODY MASS INDEX: 33.8 KG/M2 | HEIGHT: 64 IN | WEIGHT: 198 LBS

## 2019-09-30 DIAGNOSIS — M47.817 LUMBAR AND SACRAL SPONDYLARTHRITIS: ICD-10-CM

## 2019-09-30 DIAGNOSIS — M19.90 OSTEOARTHRITIS, UNSPECIFIED OSTEOARTHRITIS TYPE, UNSPECIFIED SITE: ICD-10-CM

## 2019-09-30 DIAGNOSIS — G89.4 CHRONIC PAIN SYNDROME: ICD-10-CM

## 2019-09-30 DIAGNOSIS — L40.50 PSA (PSORIATIC ARTHRITIS): Primary | ICD-10-CM

## 2019-09-30 PROCEDURE — 96372 THER/PROPH/DIAG INJ SC/IM: CPT | Mod: S$GLB,,, | Performed by: INTERNAL MEDICINE

## 2019-09-30 PROCEDURE — 99999 PR PBB SHADOW E&M-EST. PATIENT-LVL III: CPT | Mod: PBBFAC,,, | Performed by: INTERNAL MEDICINE

## 2019-09-30 PROCEDURE — 99999 PR PBB SHADOW E&M-EST. PATIENT-LVL III: ICD-10-PCS | Mod: PBBFAC,,, | Performed by: INTERNAL MEDICINE

## 2019-09-30 PROCEDURE — 99214 PR OFFICE/OUTPT VISIT, EST, LEVL IV, 30-39 MIN: ICD-10-PCS | Mod: 25,S$GLB,, | Performed by: INTERNAL MEDICINE

## 2019-09-30 PROCEDURE — 1101F PR PT FALLS ASSESS DOC 0-1 FALLS W/OUT INJ PAST YR: ICD-10-PCS | Mod: CPTII,S$GLB,, | Performed by: INTERNAL MEDICINE

## 2019-09-30 PROCEDURE — 99214 OFFICE O/P EST MOD 30 MIN: CPT | Mod: 25,S$GLB,, | Performed by: INTERNAL MEDICINE

## 2019-09-30 PROCEDURE — 1101F PT FALLS ASSESS-DOCD LE1/YR: CPT | Mod: CPTII,S$GLB,, | Performed by: INTERNAL MEDICINE

## 2019-09-30 PROCEDURE — 96372 PR INJECTION,THERAP/PROPH/DIAG2ST, IM OR SUBCUT: ICD-10-PCS | Mod: S$GLB,,, | Performed by: INTERNAL MEDICINE

## 2019-09-30 RX ORDER — SYRINGE,SAFETY WITH NEEDLE,1ML 25GX1"
SYRINGE (EA) MISCELLANEOUS
COMMUNITY
Start: 2014-10-14

## 2019-09-30 RX ORDER — CYANOCOBALAMIN 1000 UG/ML
1000 INJECTION, SOLUTION INTRAMUSCULAR; SUBCUTANEOUS
Status: COMPLETED | OUTPATIENT
Start: 2019-09-30 | End: 2019-09-30

## 2019-09-30 RX ORDER — UBIDECARENONE 75 MG
500 CAPSULE ORAL
COMMUNITY

## 2019-09-30 RX ORDER — PEN NEEDLE, DIABETIC 30 GX3/16"
NEEDLE, DISPOSABLE MISCELLANEOUS
COMMUNITY
Start: 2019-03-11

## 2019-09-30 RX ORDER — ERGOCALCIFEROL 1.25 MG/1
50000 CAPSULE ORAL
COMMUNITY
Start: 2019-08-28

## 2019-09-30 RX ORDER — AMLODIPINE BESYLATE 5 MG/1
TABLET ORAL
COMMUNITY
Start: 2015-04-24

## 2019-09-30 RX ORDER — FLUOCINONIDE 0.5 MG/G
OINTMENT TOPICAL
COMMUNITY
Start: 2017-11-06

## 2019-09-30 RX ORDER — METHYLPREDNISOLONE ACETATE 80 MG/ML
80 INJECTION, SUSPENSION INTRA-ARTICULAR; INTRALESIONAL; INTRAMUSCULAR; SOFT TISSUE
Status: COMPLETED | OUTPATIENT
Start: 2019-09-30 | End: 2019-09-30

## 2019-09-30 RX ORDER — KETOROLAC TROMETHAMINE 30 MG/ML
30 INJECTION, SOLUTION INTRAMUSCULAR; INTRAVENOUS
Status: DISCONTINUED | OUTPATIENT
Start: 2019-09-30 | End: 2019-09-30

## 2019-09-30 RX ORDER — ACETAMINOPHEN AND CODEINE PHOSPHATE 300; 30 MG/1; MG/1
1 TABLET ORAL
COMMUNITY
End: 2019-09-30 | Stop reason: SDUPTHER

## 2019-09-30 RX ORDER — PEN NEEDLE, DIABETIC 29 G X1/2"
NEEDLE, DISPOSABLE MISCELLANEOUS
COMMUNITY
Start: 2019-08-02

## 2019-09-30 RX ORDER — FERROUS GLUCONATE 324(38)MG
324 TABLET ORAL
COMMUNITY

## 2019-09-30 RX ADMIN — CYANOCOBALAMIN 1000 MCG: 1000 INJECTION, SOLUTION INTRAMUSCULAR; SUBCUTANEOUS at 04:09

## 2019-09-30 RX ADMIN — METHYLPREDNISOLONE ACETATE 80 MG: 80 INJECTION, SUSPENSION INTRA-ARTICULAR; INTRALESIONAL; INTRAMUSCULAR; SOFT TISSUE at 04:09

## 2019-09-30 NOTE — PROGRESS NOTES
Subjective:       Patient ID: Priscilla Landry is a 79 y.o. female.    Chief Complaint: Psoriatic Arthritis    Mrs. Landry is a 78 year old female,  who presents to clinic for follow upon psoriatic arthritis and osteoarthritis. Breast improved,  She has back pain and leg pain,  She has Arthralgias involved including hands, wrists, elbows, shoulders, neck, back and knees. Pain is constant and aching in nature. She complains that her fingers are locking up on her. BG this morning in 80s. No recent labs since November.     Current treatment:  1.Cosentyx  2. Prednisone 2.5 mg daily  3. Tramadol 50 mg q 8 hours PRN    Review of Systems   Constitutional: Positive for activity change. Negative for appetite change, chills, fatigue and fever.   Eyes: Negative for visual disturbance.   Respiratory: Negative for cough and shortness of breath.    Cardiovascular: Negative for chest pain, palpitations and leg swelling.   Gastrointestinal: Negative for abdominal pain, constipation, diarrhea, nausea and vomiting.   Musculoskeletal: Positive for arthralgias, gait problem and joint swelling.   Skin: Positive for color change and rash.   Neurological: Negative for dizziness, weakness, light-headedness and headaches.   Psychiatric/Behavioral: Negative for dysphoric mood and sleep disturbance. The patient is not nervous/anxious.          Objective:     There were no vitals filed for this visit.         Physical Exam   Vitals reviewed.  Constitutional:   Obese body habitus.   Eyes: Right conjunctiva is not injected. Left conjunctiva is not injected. Right eye exhibits normal extraocular motion. Left eye exhibits normal extraocular motion.   Neck: No JVD present. No thyromegaly present.   Cardiovascular: Normal rate and regular rhythm.  Exam reveals no decreased pulses.    Pulmonary/Chest: She has no wheezes. She has no rhonchi. She has no rales.       Right Side Rheumatological Exam     The patient is tender to palpation of the  shoulder, elbow, wrist, knee, 1st PIP, 1st MCP, 2nd PIP, 2nd MCP, 3rd PIP, 3rd MCP, 4th PIP, 4th MCP, 5th PIP and 5th MCP    She has swelling of the knee, 1st MCP, 2nd PIP, 2nd MCP, 3rd PIP, 3rd MCP, 4th PIP and 5th PIP    Left Side Rheumatological Exam     The patient is tender to palpation of the shoulder, elbow, wrist, knee, 1st PIP, 1st MCP, 2nd PIP, 2nd MCP, 3rd PIP, 3rd MCP, 4th PIP, 4th MCP, 5th PIP and 5th MCP.    She has swelling of the knee, 2nd PIP, 2nd MCP, 3rd PIP and 4th PIP      Lymphadenopathy:     She has no cervical adenopathy.   Neurological: Gait normal.   Skin: Rash (scales on the elbows, erythematous large lesion under both breasts and groin) noted.     Psychiatric: Mood and affect normal.         HEMOGLOBIN A1C (09/09/2019 8:02 AM CDT)  HEMOGLOBIN A1C (09/09/2019 8:02 AM CDT)   Component Value Ref Range Performed At Pathologist Signature   % Hemoglobin A1c 7.1 (H) 4.7 - 5.6 % [facility]     Estimated Average Glucose 157 (H) <115 mg/dL [facility]       HEMOGLOBIN A1C (09/09/2019 8:02 AM CDT)   Specimen   Blood     HEMOGLOBIN A1C (09/09/2019 8:02 AM CDT)   Narrative Performed At   Hemoglobin A1c Interpretative Guide    Hemoglobin A1c Result Interpretation    4.7%-5.6% Normal Reference Range    5.7%-6.4% Increased Risk for Diabetes    >6.4%      Diagnostic of Diabetes    <7.0%      Adult Glycemic Control Target           [facility]       HEMOGLOBIN A1C (09/09/2019 8:02 AM CDT)   Performing Organization Address City/State/Zipcode Phone Number   [facility]   [address] [address]      Back to top of Results       Lipid Panel (09/09/2019 8:02 AM CDT)  Only the most recent of 2 results within the time period is included.    Lipid Panel (09/09/2019 8:02 AM CDT)   Component Value Ref Range Performed At Pathologist Signature   Cholesterol 121 <200 mg/dL [facility]     HDL Cholesterol 37 (L) 40 - 59 mg/dL [facility]     LDL Cholesterol Calculated 66 <130 mg/dL [facility]     Triglycerides 90 <150  mg/dL [facility]     Non-HDL Calculated 84   [facility]     Chol/HDL Ratio 3.27 0.00 - 4.40 [facility]       Lipid Panel (09/09/2019 8:02 AM CDT)   Specimen   Blood     Lipid Panel (09/09/2019 8:02 AM CDT)   Narrative Performed At   CHOL/HDL RATIO    CHD RISK:        Male     Female        Average Risk     5.0      4.4     2x Average Risk  9.6      7.1    3x Average Risk  23.4     11.0           [facility]       Lipid Panel (09/09/2019 8:02 AM CDT)   Performing Organization Address [facility] Phone Number   [facility]   [phone] [address] [address]      Back to top of Results       Comprehensive Metabolic Panel (09/09/2019 8:02 AM CDT)  Comprehensive Metabolic Panel (09/09/2019 8:02 AM CDT)   Component Value Ref Range Performed At Pathologist Signature   Sodium 140 135 - 146 mmol/L [facility]     Potassium 4.7 3.6 - 5.2 mmol/L [facility]     Chloride 104 96 - 110 mmol/L [facility]     Carbon Dioxide 24 24 - 32 mmol/L [facility]     Glucose 205 (H) 65 - 99 mg/dL [facility]     Calcium 9.2 8.4 - 10.3 mg/dL [facility]     BUN 32.0 (H) 7.0 - 25.0 mg/dL [facility]     Creatinine 1.52 (H) 0.50 - 1.10 mg/dL [facility]     Total Protein 8.3 (H) 6.0 - 8.0 g/dL [facility]     Albumin 3.5 3.4 - 5.0 g/dL [facility]     AST 24 <45 U/L [facility]     ALT 17 <46 U/L [facility]     Alkaline Phosphatase 86 20 - 120 U/L [facility]     Bilirubin, Total 0.8 <1.3 mg/dL [facility]     EGFR, African American 37 (L) >89 mL/min [facility]     EGFR, Non  32 (L) >89 mL/min [facility]       Comprehensive Metabolic Panel (09/09/2019 8:02 AM CDT)   Specimen   Blood     Comprehensive Metabolic Panel (09/09/2019 8:02 AM CDT)   Performing Organization Address City/[facility] Phone Number   [facility]   [phone] [address] [address]      Back to top of Results       TSH with Reflex to Free T4 (09/09/2019 8:02 AM CDT)  TSH with Reflex to Free T4 (09/09/2019 8:02 AM CDT)   Component Value Ref Range Performed At Pathologist Signature   TSH 3.45 0.50 -  5.00 uIU/mL [facility]       TSH with Reflex to Free T4 (09/09/2019 8:02 AM CDT)   Specimen   Blood     TSH with Reflex to Free T4 (09/09/2019 8:02 AM CDT)   Performing Organization Address City/State/Zipcode Phone Number   [facility]   [address] [address]   [place] [address]      Back to top of Results       Phosphorus (09/09/2019 8:02 AM CDT)  Phosphorus (09/09/2019 8:02 AM CDT)   Component Value Ref Range Performed At Pathologist Signature   Phosphorus 4.0 2.5 - 4.7 mg/dL [facility]       Phosphorus (09/09/2019 8:02 AM CDT)   Specimen   Blood     Phosphorus (09/09/2019 8:02 AM CDT)   Performing Organization Address City/State/Zipcode Phone Number   [facility]   [id number] [address]             Assessment:       1. PSA (psoriatic arthritis)    2. Osteoarthritis, unspecified osteoarthritis type, unspecified site    3. Lumbar and sacral spondylarthritis            Plan:       PSA (psoriatic arthritis)  -     methylPREDNISolone acetate injection 80 mg  -     Discontinue: ketorolac injection 30 mg  -     cyanocobalamin injection 1,000 mcg    Osteoarthritis, unspecified osteoarthritis type, unspecified site  -     methylPREDNISolone acetate injection 80 mg  -     Discontinue: ketorolac injection 30 mg  -     cyanocobalamin injection 1,000 mcg    Lumbar and sacral spondylarthritis  -     methylPREDNISolone acetate injection 80 mg  -     Discontinue: ketorolac injection 30 mg  -     cyanocobalamin injection 1,000 mcg

## 2019-09-30 NOTE — PROGRESS NOTES
Administered 1 cc ( 1000 mcg/ml ) of b12 to the right upper outer gluteal. Informed of s/s to report verbalized understanding. No adverse reactions noted.    Lot # [id number]  Expiration feb 21    Administered 1 cc ( 80 mg/ml ) of depomedrol to the right upper outer gluteal. Informed of s/s to report verbalized understanding. No adverse reactions noted.    Lot # [id number]  Expiration 01/21

## 2019-10-03 NOTE — TELEPHONE ENCOUNTER
----- Message from Eileen Gerard sent at 10/3/2019  2:32 PM CDT -----  Type: Needs Medical Advice    Who Called:  self  Symptoms (please be specific):  Pt went to pharmacy / they have not received prescriptions   How long has patient had these symptoms:  predniSONE (DELTASONE) 2.5 MG tablet and tylenol # 3   Pharmacy name and phone #:    [facility] [place], [place] - [address] [place] [address]  Phone: [phone] Fax: [phone]    Best Call Back Number: [phone] (home)     Additional Information: please advise

## 2019-10-03 NOTE — TELEPHONE ENCOUNTER
Pt notified that refill request received and forwarded to Dr. Farias for review. Pt advised to allow up to 72 hours for her refill request to be processed. Pt verbalized understanding,.

## 2019-10-07 RX ORDER — ACETAMINOPHEN AND CODEINE PHOSPHATE 300; 30 MG/1; MG/1
TABLET ORAL
Qty: 30 TABLET | Refills: 3 | Status: SHIPPED | OUTPATIENT
Start: 2019-10-07 | End: 2020-01-17 | Stop reason: SDUPTHER

## 2019-10-07 RX ORDER — PREDNISONE 2.5 MG/1
TABLET ORAL
Qty: 90 TABLET | Refills: 3 | Status: SHIPPED | OUTPATIENT
Start: 2019-10-07 | End: 2020-01-17

## 2019-10-10 ENCOUNTER — TELEPHONE (OUTPATIENT)
Dept: PHARMACY | Facility: CLINIC | Age: 79
End: 2019-10-10

## 2019-10-10 NOTE — TELEPHONE ENCOUNTER
Refill and follow up complete Cosentyx. Patient confirmed need for refill, still injecting on the 15th every month and 0 doses on hand. Shipment set for 10/14. for patient to receive 10/15. Address verified. $0 copay (004). Patient reports no changes to other medications, conditions or allergies. No missed doses. No other questions or concerns today.

## 2019-10-13 RX ORDER — DULOXETIN HYDROCHLORIDE 30 MG/1
30 CAPSULE, DELAYED RELEASE ORAL NIGHTLY
Qty: 30 CAPSULE | Refills: 12 | Status: SHIPPED | OUTPATIENT
Start: 2019-10-13

## 2019-10-13 RX ORDER — ACETAMINOPHEN AND CODEINE PHOSPHATE 300; 30 MG/1; MG/1
1 TABLET ORAL EVERY 8 HOURS PRN
Qty: 90 TABLET | Refills: 3 | Status: SHIPPED | OUTPATIENT
Start: 2019-10-13 | End: 2019-11-12

## 2019-10-13 RX ORDER — CLOBETASOL PROPIONATE 0.5 MG/G
OINTMENT TOPICAL
Qty: 60 G | Refills: 3 | Status: SHIPPED | OUTPATIENT
Start: 2019-10-13

## 2019-10-13 RX ORDER — PREDNISONE 2.5 MG/1
2.5 TABLET ORAL DAILY
Qty: 90 TABLET | Refills: 3 | Status: SHIPPED | OUTPATIENT
Start: 2019-10-13

## 2019-10-25 ENCOUNTER — OFFICE VISIT (OUTPATIENT)
Dept: PODIATRY | Facility: CLINIC | Age: 79
End: 2019-10-25
Payer: MEDICARE

## 2019-10-25 VITALS — WEIGHT: 198 LBS | HEIGHT: 64 IN | BODY MASS INDEX: 33.8 KG/M2

## 2019-10-25 DIAGNOSIS — E11.42 DIABETIC POLYNEUROPATHY ASSOCIATED WITH TYPE 2 DIABETES MELLITUS: Primary | ICD-10-CM

## 2019-10-25 DIAGNOSIS — L84 CORN OR CALLUS: ICD-10-CM

## 2019-10-25 DIAGNOSIS — M20.41 HAMMER TOE OF RIGHT FOOT: ICD-10-CM

## 2019-10-25 DIAGNOSIS — Z89.432 HISTORY OF TRANSMETATARSAL AMPUTATION OF LEFT FOOT: ICD-10-CM

## 2019-10-25 DIAGNOSIS — I73.9 PAD (PERIPHERAL ARTERY DISEASE): ICD-10-CM

## 2019-10-25 DIAGNOSIS — B35.1 ONYCHOMYCOSIS DUE TO DERMATOPHYTE: ICD-10-CM

## 2019-10-25 PROCEDURE — 11055 PARING/CUTG B9 HYPRKER LES 1: CPT | Mod: Q9,S$GLB,, | Performed by: PODIATRIST

## 2019-10-25 PROCEDURE — 99999 PR PBB SHADOW E&M-EST. PATIENT-LVL IV: ICD-10-PCS | Mod: PBBFAC,,, | Performed by: PODIATRIST

## 2019-10-25 PROCEDURE — 11720 PR DEBRIDEMENT OF NAIL(S), 1-5: ICD-10-PCS | Mod: Q9,59,S$GLB, | Performed by: PODIATRIST

## 2019-10-25 PROCEDURE — 99499 NO LOS: ICD-10-PCS | Mod: S$GLB,,, | Performed by: PODIATRIST

## 2019-10-25 PROCEDURE — 11055 PR TRIM HYPERKERATOTIC SKIN LESION, ONE: ICD-10-PCS | Mod: Q9,S$GLB,, | Performed by: PODIATRIST

## 2019-10-25 PROCEDURE — 11720 DEBRIDE NAIL 1-5: CPT | Mod: Q9,59,S$GLB, | Performed by: PODIATRIST

## 2019-10-25 PROCEDURE — 99499 UNLISTED E&M SERVICE: CPT | Mod: S$GLB,,, | Performed by: PODIATRIST

## 2019-10-25 PROCEDURE — 99999 PR PBB SHADOW E&M-EST. PATIENT-LVL IV: CPT | Mod: PBBFAC,,, | Performed by: PODIATRIST

## 2019-10-25 NOTE — PROGRESS NOTES
Subjective:      Patient ID: Priscilla Landry is a 79 y.o. female.    Chief Complaint: Follow-up (3mth Dr Contreras 2018 )    Priscilla is a 79 y.o. female who presents to the clinic for routine evaluation and treatment of diabetic feet. Priscilla has a past medical history of Cataracts, bilateral, Coronary artery disease, Diabetes mellitus, Glaucoma, Hyperlipidemia, Hypertension, Psoriasis, and Type 2 diabetes mellitus. Patient denies major issues with today's exam.  Denies pain from the TMA stump site, however, she continues to experience the usual callus build up.  Continues applying lotion to the site daily.  Also, relates having toenails in need of trimming.  Denies being painful with wearing shoe gear.  Has not attempted to self treat.  Denies any additional pedal complaints.      PCP: Jonna Contreras MD    Date Last Seen by PCP: 2018    No results found for: HGBA1C    Past Medical History:   Diagnosis Date    Cataracts, bilateral     Coronary artery disease     Diabetes mellitus     Glaucoma     Hyperlipidemia     Hypertension     Psoriasis     Type 2 diabetes mellitus        Past Surgical History:   Procedure Laterality Date    CARDIAC CATHETERIZATION      CAROTID STENT      CHOLECYSTECTOMY      HYSTERECTOMY      KIDNEY STONE SURGERY         Family History   Problem Relation Age of Onset    Hypertension Mother     Heart disease Mother     Diabetes Mellitus Mother        Social History     Socioeconomic History    Marital status:      Spouse name: Not on file    Number of children: Not on file    Years of education: Not on file    Highest education level: Not on file   Occupational History    Not on file   Social Needs    Financial resource strain: Not on file    Food insecurity:     Worry: Not on file     Inability: Not on file    Transportation needs:     Medical: Not on file     Non-medical: Not on file   Tobacco Use    Smoking status: Never Smoker    Smokeless tobacco: Never Used    Substance and Sexual Activity    Alcohol use: No    Drug use: No    Sexual activity: Not on file   Lifestyle    Physical activity:     Days per week: Not on file     Minutes per session: Not on file    Stress: Not on file   Relationships    Social connections:     Talks on phone: Not on file     Gets together: Not on file     Attends Alevism service: Not on file     Active member of club or organization: Not on file     Attends meetings of clubs or organizations: Not on file     Relationship status: Not on file   Other Topics Concern    Not on file   Social History Narrative    Not on file       Current Outpatient Medications   Medication Sig Dispense Refill    acetaminophen-codeine 300-30mg (TYLENOL #3) 300-30 mg Tab Take 1 tablet by mouth every 8 (eight) hours as needed. 90 tablet 3    acetaminophen-codeine 300-30mg (TYLENOL #3) 300-30 mg Tab TAKE 1 TABLET EVERY DAY AS NEEDED FOR PAIN *THANK YOU* ** MAY CAUSE DROWSINESS ** 30 tablet 3    albuterol (VENTOLIN HFA) 90 mcg/actuation inhaler Inhale 2 puffs into the lungs every 6 (six) hours as needed for Wheezing. Rescue      allopurinol (ZYLOPRIM) 300 MG tablet Take 300 mg by mouth once daily.      amLODIPine (NORVASC) 5 MG tablet TAKE 1 TABLET EVERY MORNING FOR BLOOD PRESSURE      arm brace (NEOPRENE WRIST SPLINT SUPPORT) Misc 1 each by Misc.(Non-Drug; Combo Route) route once daily. 2 each 0    aspirin (ECOTRIN) 325 MG EC tablet Take 325 mg by mouth once daily.      atorvastatin (LIPITOR) 40 MG tablet Take 40 mg by mouth every evening.      BD INSULIN SYRINGE ULTRA-FINE 1 mL 31 gauge x 5/16 Syrg       calcitRIOL (ROCALTROL) 0.5 MCG Cap Take 0.5 mcg by mouth once daily.      calcium carbonate (OS-ANGIE) 600 mg calcium (1,500 mg) Tab Take 600 mg by mouth once. Take one tablet at noon      clobetasol 0.05% (TEMOVATE) 0.05 % Oint Apply topically two times daily to extremities (elbows/knees) 60 g 3    clopidogrel (PLAVIX) 75 mg tablet Take 75 mg by  "mouth once daily.      cyanocobalamin 500 MCG tablet Take 500 mcg by mouth.      DULoxetine (CYMBALTA) 30 MG capsule Take 1 capsule (30 mg total) by mouth every evening. In the pm 30 capsule 12    ergocalciferol (ERGOCALCIFEROL) 50,000 unit Cap Take 50,000 Units by mouth.      ferrous gluconate (FERGON) 324 MG tablet Take 324 mg by mouth.      fluocinonide (LIDEX) 0.05 % ointment AAA BID      furosemide (LASIX) 20 MG tablet Take 20 mg by mouth 2 (two) times daily.      gabapentin (NEURONTIN) 400 MG capsule Take 400 mg by mouth every evening.      insulin glargine (LANTUS) 100 unit/mL injection Inject 90 Units into the skin.      insulin NPH (NOVOLIN N NPH U-100 INSULIN) 100 unit/mL injection INJECT 35 UNITS SUBCUTANEOUSLY BEFORE BREAKFAST AND 35 UNITS SUBCUTANEOUSLY BEFORE LUNCH *THANK YOU*      insulin syringe-needle U-100 (BD INSULIN SYRINGE ULTRA-FINE) 1 mL 31 gauge x 5/16 Syrg USE AS DIRECTED TWICE DAILY *THANK YOU*      lancets (TRUEPLUS LANCETS) 28 gauge Misc USE AS DIRECTED 3 TIMES DAILY *THANK YOU*      levothyroxine (SYNTHROID) 25 MCG tablet Take 25 mcg by mouth before breakfast.      loratadine (CLARITIN) 10 mg tablet TAKE 1 TABLET AT BEDTIME FOR SINUS AND ALLERGIES *THANK YOU*      metoprolol tartrate (LOPRESSOR) 25 MG tablet Take 25 mg by mouth 2 (two) times daily.      multivitamin with minerals tablet Take 1 tablet by mouth once daily. Take 1 tablet at noon      omeprazole (PRILOSEC) 20 MG capsule Take 20 mg by mouth once daily.      pen needle, diabetic (UNIFINE PENTIPS) 31 gauge x 5/16" Ndle USE AS DIRECTED DAILY WITH BASAGLAR *THANK YOU*      predniSONE (DELTASONE) 2.5 MG tablet Take 1 tablet (2.5 mg total) by mouth once daily. 90 tablet 3    predniSONE (DELTASONE) 2.5 MG tablet TAKE 1 TABLET EVERY DAY WITH FOOD *THANK YOU* 90 tablet 3    secukinumab (COSENTYX PEN, 2 PENS,) 150 mg/mL PnIj Inject 300 mg into the skin every 30 days. 2 mL 11    triamcinolone acetonide 0.1% " (KENALOG) 0.1 % cream Apply topically twice daily to affected areas under the breasts and under the abdomen 80 g 1     No current facility-administered medications for this visit.        Review of patient's allergies indicates:   Allergen Reactions    Strawberry Hives    Sulfa (sulfonamide antibiotics) Swelling     Unknown^         Review of Systems   Constitution: Negative for chills and fever.   Cardiovascular: Positive for leg swelling. Negative for claudication.   Skin: Positive for color change and nail changes.   Musculoskeletal: Positive for arthritis, joint pain and joint swelling. Negative for muscle cramps and muscle weakness.   Neurological: Positive for numbness and paresthesias.   Psychiatric/Behavioral: Negative for altered mental status.           Objective:      Physical Exam   Constitutional: She is oriented to person, place, and time. She appears well-developed and well-nourished. No distress.   Cardiovascular:   Pulses:       Dorsalis pedis pulses are 1+ on the right side, and 1+ on the left side.        Posterior tibial pulses are 1+ on the right side, and 1+ on the left side.   CFT is 3 seconds on the Rt.  Pedal hair growth is absent bilateral.  Varicosities noted bilateral.  Moderate non pitting edema noted to bilateral lower extremity.  Toes are cool to touch on the Rt.   Musculoskeletal: She exhibits edema. She exhibits no tenderness.   Muscle strength 5/5 in all muscle groups bilateral.  No tenderness nor crepitation with ROM of foot/ankle joints bilateral.  No tenderness with palpation of bilateral foot and ankle.  Bilateral pes planus foot type.  Lt. TMA.  Rt. Hallux abducto valgus.  Rt. Semi-rigid contracture of toes 2-5.   Neurological: She is alert and oriented to person, place, and time. She has normal strength. A sensory deficit is present.   Protective sensation per [place] monofilament is decreased bilateral.  Vibratory sensation is absent to the level of the malleoli  bilateral.  Light touch is intact bilateral.   Skin: Skin is warm and dry. Capillary refill takes 2 to 3 seconds. Lesion noted. No abrasion, no bruising, no burn, no ecchymosis, no petechiae and no rash noted. She is not diaphoretic. No erythema. No pallor.   Pedal skin appears edematous bilateral.  Toenails x 5 appear thickened by 2 mm's, elongated by 4 mm's, and discolored with subungual debris.  Hyperkeratotic lesion noted to the Lt. TMA stump site.  Examination of the skin reveals no evidence of significant maceration, rashes, open lesions, suspicious appearing nevi or other concerning lesions.              Assessment:       Encounter Diagnoses   Name Primary?    Diabetic polyneuropathy associated with type 2 diabetes mellitus Yes    PAD (peripheral artery disease)     History of transmetatarsal amputation of left foot     Corn or callus     Hammer toe of right foot     Onychomycosis due to dermatophyte          Plan:       Priscilla was seen today for follow-up.    Diagnoses and all orders for this visit:    Diabetic polyneuropathy associated with type 2 diabetes mellitus    PAD (peripheral artery disease)    History of transmetatarsal amputation of left foot    Corn or callus    Hammer toe of right foot    Onychomycosis due to dermatophyte      I counseled the patient on her conditions, their implications and medical management.    Advised to continue wearing shoe gear that accommodates foot deformities.    PAD remains stable with today's visit.  Will continue to monitor.    Shoe inspection. Diabetic Foot Education. Patient reminded of the importance of good nutrition and blood sugar control to help prevent podiatric complications of diabetes. Patient instructed on proper foot hygeine. We discussed wearing proper shoe gear, daily foot inspections, never walking without protective shoe gear, never putting sharp instruments to feet    With patient's permission, nails were aggressively reduced and debrided x 5  to their soft tissue attachment mechanically and with electric , removing all offending nail and debris.  Also, a sterile #15 scalpel was used to trim the lesion x 1 down to smooth appearing skin without incident.  Patient relates relief following the procedure. She will continue to monitor the areas daily, inspect her feet, wear protective shoe gear when ambulatory, moisturizer to maintain skin integrity and follow in this office in approximately 2-3 months, sooner p.r.n.    Follow up in about 3 months (around 1/25/2020).    Tremaine Carter DPM

## 2019-11-05 ENCOUNTER — TELEPHONE (OUTPATIENT)
Dept: PHARMACY | Facility: CLINIC | Age: 79
End: 2019-11-05

## 2019-12-10 ENCOUNTER — TELEPHONE (OUTPATIENT)
Dept: PHARMACY | Facility: CLINIC | Age: 79
End: 2019-12-10

## 2020-01-07 ENCOUNTER — TELEPHONE (OUTPATIENT)
Dept: PHARMACY | Facility: CLINIC | Age: 80
End: 2020-01-07

## 2020-01-17 ENCOUNTER — OFFICE VISIT (OUTPATIENT)
Dept: RHEUMATOLOGY | Facility: CLINIC | Age: 80
End: 2020-01-17
Payer: MEDICARE

## 2020-01-17 VITALS
BODY MASS INDEX: 33.64 KG/M2 | WEIGHT: 196 LBS | DIASTOLIC BLOOD PRESSURE: 53 MMHG | HEART RATE: 62 BPM | SYSTOLIC BLOOD PRESSURE: 144 MMHG

## 2020-01-17 DIAGNOSIS — J32.9 SINUSITIS, UNSPECIFIED CHRONICITY, UNSPECIFIED LOCATION: ICD-10-CM

## 2020-01-17 DIAGNOSIS — L40.50 PSA (PSORIATIC ARTHRITIS): Primary | ICD-10-CM

## 2020-01-17 DIAGNOSIS — B37.2 INTERTRIGINOUS CANDIDIASIS: ICD-10-CM

## 2020-01-17 DIAGNOSIS — Z79.899 IMMUNOCOMPROMISED STATE DUE TO DRUG THERAPY: ICD-10-CM

## 2020-01-17 DIAGNOSIS — D84.821 IMMUNOCOMPROMISED STATE DUE TO DRUG THERAPY: ICD-10-CM

## 2020-01-17 DIAGNOSIS — G89.4 CHRONIC PAIN SYNDROME: ICD-10-CM

## 2020-01-17 PROCEDURE — 96372 PR INJECTION,THERAP/PROPH/DIAG2ST, IM OR SUBCUT: ICD-10-PCS | Mod: S$GLB,,, | Performed by: PHYSICIAN ASSISTANT

## 2020-01-17 PROCEDURE — 99214 OFFICE O/P EST MOD 30 MIN: CPT | Mod: 25,S$GLB,, | Performed by: PHYSICIAN ASSISTANT

## 2020-01-17 PROCEDURE — 1101F PR PT FALLS ASSESS DOC 0-1 FALLS W/OUT INJ PAST YR: ICD-10-PCS | Mod: CPTII,S$GLB,, | Performed by: PHYSICIAN ASSISTANT

## 2020-01-17 PROCEDURE — 1125F PR PAIN SEVERITY QUANTIFIED, PAIN PRESENT: ICD-10-PCS | Mod: S$GLB,,, | Performed by: PHYSICIAN ASSISTANT

## 2020-01-17 PROCEDURE — 1125F AMNT PAIN NOTED PAIN PRSNT: CPT | Mod: S$GLB,,, | Performed by: PHYSICIAN ASSISTANT

## 2020-01-17 PROCEDURE — 99999 PR PBB SHADOW E&M-EST. PATIENT-LVL V: CPT | Mod: PBBFAC,,, | Performed by: PHYSICIAN ASSISTANT

## 2020-01-17 PROCEDURE — 1159F MED LIST DOCD IN RCRD: CPT | Mod: S$GLB,,, | Performed by: PHYSICIAN ASSISTANT

## 2020-01-17 PROCEDURE — 1159F PR MEDICATION LIST DOCUMENTED IN MEDICAL RECORD: ICD-10-PCS | Mod: S$GLB,,, | Performed by: PHYSICIAN ASSISTANT

## 2020-01-17 PROCEDURE — 96372 THER/PROPH/DIAG INJ SC/IM: CPT | Mod: S$GLB,,, | Performed by: PHYSICIAN ASSISTANT

## 2020-01-17 PROCEDURE — 99999 PR PBB SHADOW E&M-EST. PATIENT-LVL V: ICD-10-PCS | Mod: PBBFAC,,, | Performed by: PHYSICIAN ASSISTANT

## 2020-01-17 PROCEDURE — 1101F PT FALLS ASSESS-DOCD LE1/YR: CPT | Mod: CPTII,S$GLB,, | Performed by: PHYSICIAN ASSISTANT

## 2020-01-17 PROCEDURE — 99214 PR OFFICE/OUTPT VISIT, EST, LEVL IV, 30-39 MIN: ICD-10-PCS | Mod: 25,S$GLB,, | Performed by: PHYSICIAN ASSISTANT

## 2020-01-17 RX ORDER — CYANOCOBALAMIN 1000 UG/ML
1000 INJECTION, SOLUTION INTRAMUSCULAR; SUBCUTANEOUS
Status: COMPLETED | OUTPATIENT
Start: 2020-01-17 | End: 2020-01-17

## 2020-01-17 RX ORDER — DOXYCYCLINE 100 MG/1
100 CAPSULE ORAL EVERY 12 HOURS
Qty: 20 CAPSULE | Refills: 0 | Status: SHIPPED | OUTPATIENT
Start: 2020-01-17 | End: 2020-01-27

## 2020-01-17 RX ORDER — METHYLPREDNISOLONE ACETATE 80 MG/ML
80 INJECTION, SUSPENSION INTRA-ARTICULAR; INTRALESIONAL; INTRAMUSCULAR; SOFT TISSUE
Status: COMPLETED | OUTPATIENT
Start: 2020-01-17 | End: 2020-01-17

## 2020-01-17 RX ORDER — NYSTATIN 100000 [USP'U]/G
POWDER TOPICAL
Qty: 60 G | Refills: 0 | Status: SHIPPED | OUTPATIENT
Start: 2020-01-17

## 2020-01-17 RX ADMIN — CYANOCOBALAMIN 1000 MCG: 1000 INJECTION, SOLUTION INTRAMUSCULAR; SUBCUTANEOUS at 11:01

## 2020-01-17 RX ADMIN — METHYLPREDNISOLONE ACETATE 80 MG: 80 INJECTION, SUSPENSION INTRA-ARTICULAR; INTRALESIONAL; INTRAMUSCULAR; SOFT TISSUE at 11:01

## 2020-01-17 NOTE — PROGRESS NOTES
Subjective:       Patient ID: Priscilla Landry is a 79 y.o. female.    Chief Complaint: Psoriatic Arthritis    Mrs. Landry is a 79 year old female who presents to clinic for follow up on psoriatic arthritis and osteoarthritis. She complains of 2 week history of sinus congestion, productive cough, chest congestion, and hoarseness. She also had a boil on her forearm that drained last week. She held her last cosentyx, which were due earlier this week. She continues to have rash under the breast and under the abdomen and is using triple antibiotic ointment. Arthritis is well controlled on cosentyx.  Arthralgias involved including hands, wrists, elbows, shoulders, neck, back and knees. Pain is constant and aching in nature. She complains that her fingers are locking up on her. BG this morning in 90s.     Current treatment:  1. Cosentyx 300 mg monthly  2. Prednisone 2.5 mg daily  3. Tylenol #3    Review of Systems   Constitutional: Positive for activity change. Negative for appetite change, chills, fatigue and fever.   Eyes: Negative for visual disturbance.   Respiratory: Negative for cough and shortness of breath.    Cardiovascular: Negative for chest pain, palpitations and leg swelling.   Gastrointestinal: Negative for abdominal pain, constipation, diarrhea, nausea and vomiting.   Musculoskeletal: Positive for arthralgias, gait problem and joint swelling.   Skin: Positive for color change and rash.   Neurological: Negative for dizziness, weakness, light-headedness and headaches.   Psychiatric/Behavioral: Negative for dysphoric mood and sleep disturbance. The patient is not nervous/anxious.          Objective:     Vitals:    01/17/20 1501   BP: (!) 144/53   Pulse: 62       Past Medical History:   Diagnosis Date    Cataracts, bilateral     Coronary artery disease     Diabetes mellitus     Glaucoma     Hyperlipidemia     Hypertension     Psoriasis     Type 2 diabetes mellitus      Past Surgical History:    Procedure Laterality Date    CARDIAC CATHETERIZATION      CAROTID STENT      CHOLECYSTECTOMY      HYSTERECTOMY      KIDNEY STONE SURGERY            Physical Exam   Vitals reviewed.  Constitutional:   Obese body habitus.   Eyes: Right conjunctiva is not injected. Left conjunctiva is not injected. Right eye exhibits normal extraocular motion. Left eye exhibits normal extraocular motion.   Neck: No JVD present. No thyromegaly present.   Cardiovascular: Normal rate and regular rhythm.  Exam reveals no decreased pulses.    Pulmonary/Chest: She has no wheezes. She has no rhonchi. She has no rales.       Right Side Rheumatological Exam     The patient is tender to palpation of the shoulder, elbow, wrist, knee, 1st PIP, 1st MCP, 2nd PIP, 2nd MCP, 3rd PIP, 3rd MCP, 4th PIP, 4th MCP, 5th PIP and 5th MCP    She has swelling of the knee, 1st MCP, 2nd PIP, 2nd MCP, 3rd PIP, 3rd MCP, 4th PIP and 5th PIP    Left Side Rheumatological Exam     The patient is tender to palpation of the shoulder, elbow, wrist, knee, 1st PIP, 1st MCP, 2nd PIP, 2nd MCP, 3rd PIP, 3rd MCP, 4th PIP, 4th MCP, 5th PIP and 5th MCP.    She has swelling of the knee, 2nd PIP, 2nd MCP, 3rd PIP and 4th PIP      Lymphadenopathy:     She has no cervical adenopathy.   Neurological: Gait normal.   Skin: Rash (faint erythema under the breast and under abdominal pannus) noted.          Psychiatric: Mood and affect normal.       No new labs to review  Assessment:       1. PSA (psoriatic arthritis)    2. Sinusitis, unspecified chronicity, unspecified location    3. Intertriginous candidiasis    4. Immunocompromised state due to drug therapy            Plan:       PSA (psoriatic arthritis)  -     methylPREDNISolone acetate injection 80 mg  -     cyanocobalamin injection 1,000 mcg  -     CBC auto differential; Future; Expected date: 01/17/2020  -     Comprehensive metabolic panel; Future; Expected date: 01/17/2020  -     C-reactive protein; Future; Expected date:  01/17/2020  -     Sedimentation rate, automated; Future; Expected date: 01/17/2020  -     Uric acid; Future; Expected date: 01/17/2020  -     Hepatitis B core antibody, IgM; Future; Expected date: 01/17/2020  -     Hepatitis B surface antigen; Future; Expected date: 01/17/2020  -     Hepatitis C RNA, quantitative, PCR; Future; Expected date: 01/17/2020    Sinusitis, unspecified chronicity, unspecified location  -     doxycycline (VIBRAMYCIN) 100 MG Cap; Take 1 capsule (100 mg total) by mouth every 12 (twelve) hours. for 10 days  Dispense: 20 capsule; Refill: 0    Intertriginous candidiasis  -     nystatin (MYCOSTATIN) powder; Apply twice daily until healing is complete  Dispense: 60 g; Refill: 0    Immunocompromised state due to drug therapy  -     CBC auto differential; Future; Expected date: 01/17/2020  -     Comprehensive metabolic panel; Future; Expected date: 01/17/2020  -     C-reactive protein; Future; Expected date: 01/17/2020  -     Sedimentation rate, automated; Future; Expected date: 01/17/2020  -     Uric acid; Future; Expected date: 01/17/2020  -     Hepatitis B core antibody, IgM; Future; Expected date: 01/17/2020  -     Hepatitis B surface antigen; Future; Expected date: 01/17/2020  -     Hepatitis C RNA, quantitative, PCR; Future; Expected date: 01/17/2020        Assessment:  78 year old female with  Psoriatic arthritis, osteoarthritis on cosentyx 300 mg  --type 2 diabetes (HgbA1c 7.1)  -- CKD stage 3 followed by Dr. Ferris  --Hx of Hep C antibody positive with negative viral load  --chronic pain syndrome on tylenol #3    Plan:   1. Check labs   2. Start doxycycline 100 mg bid x 10 days  3. Cont. cosentyx 300 mg monthly (hold until infection resolves)  4. Start nystatin powder PRN  5. Depo medrol 80 mg and b12 given today in clinic  6. Cont. Tylenol #3 per Dr. Farias.I have checked [place] prescription monitoring program site and no unusual or abnormal behavior has occurred pt understand the risk  and benefits of taking opioid medications and has decided to continue the  medication   7. Consider changing therapy to Enbrel in the future    Follow up: 3 months with Dr. henson

## 2020-01-20 RX ORDER — ACETAMINOPHEN AND CODEINE PHOSPHATE 300; 30 MG/1; MG/1
TABLET ORAL
Qty: 30 TABLET | Refills: 3 | Status: SHIPPED | OUTPATIENT
Start: 2020-01-20

## 2020-01-31 ENCOUNTER — OFFICE VISIT (OUTPATIENT)
Dept: PODIATRY | Facility: CLINIC | Age: 80
End: 2020-01-31
Payer: MEDICARE

## 2020-01-31 VITALS — BODY MASS INDEX: 33.46 KG/M2 | WEIGHT: 196 LBS | HEIGHT: 64 IN

## 2020-01-31 DIAGNOSIS — M20.41 HAMMER TOE OF RIGHT FOOT: ICD-10-CM

## 2020-01-31 DIAGNOSIS — I73.9 PAD (PERIPHERAL ARTERY DISEASE): ICD-10-CM

## 2020-01-31 DIAGNOSIS — B35.1 ONYCHOMYCOSIS DUE TO DERMATOPHYTE: ICD-10-CM

## 2020-01-31 DIAGNOSIS — Z89.432 HISTORY OF TRANSMETATARSAL AMPUTATION OF LEFT FOOT: ICD-10-CM

## 2020-01-31 DIAGNOSIS — L84 CORN OR CALLUS: ICD-10-CM

## 2020-01-31 DIAGNOSIS — E11.42 DIABETIC POLYNEUROPATHY ASSOCIATED WITH TYPE 2 DIABETES MELLITUS: Primary | ICD-10-CM

## 2020-01-31 PROCEDURE — 1101F PT FALLS ASSESS-DOCD LE1/YR: CPT | Mod: CPTII,S$GLB,, | Performed by: PODIATRIST

## 2020-01-31 PROCEDURE — 1126F PR PAIN SEVERITY QUANTIFIED, NO PAIN PRESENT: ICD-10-PCS | Mod: S$GLB,,, | Performed by: PODIATRIST

## 2020-01-31 PROCEDURE — 99213 PR OFFICE/OUTPT VISIT, EST, LEVL III, 20-29 MIN: ICD-10-PCS | Mod: 25,S$GLB,, | Performed by: PODIATRIST

## 2020-01-31 PROCEDURE — 99213 OFFICE O/P EST LOW 20 MIN: CPT | Mod: 25,S$GLB,, | Performed by: PODIATRIST

## 2020-01-31 PROCEDURE — 1159F MED LIST DOCD IN RCRD: CPT | Mod: S$GLB,,, | Performed by: PODIATRIST

## 2020-01-31 PROCEDURE — 1126F AMNT PAIN NOTED NONE PRSNT: CPT | Mod: S$GLB,,, | Performed by: PODIATRIST

## 2020-01-31 PROCEDURE — 11721 PR DEBRIDEMENT OF NAILS, 6 OR MORE: ICD-10-PCS | Mod: 59,Q9,S$GLB, | Performed by: PODIATRIST

## 2020-01-31 PROCEDURE — 1159F PR MEDICATION LIST DOCUMENTED IN MEDICAL RECORD: ICD-10-PCS | Mod: S$GLB,,, | Performed by: PODIATRIST

## 2020-01-31 PROCEDURE — 99999 PR PBB SHADOW E&M-EST. PATIENT-LVL II: CPT | Mod: PBBFAC,,, | Performed by: PODIATRIST

## 2020-01-31 PROCEDURE — 1101F PR PT FALLS ASSESS DOC 0-1 FALLS W/OUT INJ PAST YR: ICD-10-PCS | Mod: CPTII,S$GLB,, | Performed by: PODIATRIST

## 2020-01-31 PROCEDURE — 11721 DEBRIDE NAIL 6 OR MORE: CPT | Mod: 59,Q9,S$GLB, | Performed by: PODIATRIST

## 2020-01-31 PROCEDURE — 11055 PARING/CUTG B9 HYPRKER LES 1: CPT | Mod: Q9,S$GLB,, | Performed by: PODIATRIST

## 2020-01-31 PROCEDURE — 99999 PR PBB SHADOW E&M-EST. PATIENT-LVL II: ICD-10-PCS | Mod: PBBFAC,,, | Performed by: PODIATRIST

## 2020-01-31 PROCEDURE — 11055 PR TRIM HYPERKERATOTIC SKIN LESION, ONE: ICD-10-PCS | Mod: Q9,S$GLB,, | Performed by: PODIATRIST

## 2020-01-31 NOTE — PROGRESS NOTES
Subjective:      Patient ID: Priscilla Landry is a 79 y.o. female.    Chief Complaint: Diabetes Mellitus ([place] 2/4/2020) and Diabetic Foot Exam    Priscilla is a 79 y.o. female who presents to the clinic for routine evaluation and treatment of diabetic feet. Priscilla has a past medical history of Cataracts, bilateral, Coronary artery disease, Diabetes mellitus, Glaucoma, Hyperlipidemia, Hypertension, Psoriasis, and Type 2 diabetes mellitus. Patient denies major issues with today's exam.  Relates needing her toenails and a large callus at the Lt. Stump site that are in need of trimming. Continues applying lotion to the calloused stump daily.  Denies pain in the feet with ambulation.  Denies any additional pedal complaints.      PCP: Jonna Contreras MD    Date Last Seen by PCP: 2/20    No results found for: HGBA1C    Past Medical History:   Diagnosis Date    Cataracts, bilateral     Coronary artery disease     Diabetes mellitus     Glaucoma     Hyperlipidemia     Hypertension     Psoriasis     Type 2 diabetes mellitus        Past Surgical History:   Procedure Laterality Date    CARDIAC CATHETERIZATION      CAROTID STENT      CHOLECYSTECTOMY      HYSTERECTOMY      KIDNEY STONE SURGERY         Family History   Problem Relation Age of Onset    Hypertension Mother     Heart disease Mother     Diabetes Mellitus Mother        Social History     Socioeconomic History    Marital status:      Spouse name: Not on file    Number of children: Not on file    Years of education: Not on file    Highest education level: Not on file   Occupational History    Not on file   Social Needs    Financial resource strain: Not on file    Food insecurity:     Worry: Not on file     Inability: Not on file    Transportation needs:     Medical: Not on file     Non-medical: Not on file   Tobacco Use    Smoking status: Never Smoker    Smokeless tobacco: Never Used   Substance and Sexual Activity    Alcohol use: No     Drug use: No    Sexual activity: Not on file   Lifestyle    Physical activity:     Days per week: Not on file     Minutes per session: Not on file    Stress: Not on file   Relationships    Social connections:     Talks on phone: Not on file     Gets together: Not on file     Attends Evangelical service: Not on file     Active member of club or organization: Not on file     Attends meetings of clubs or organizations: Not on file     Relationship status: Not on file   Other Topics Concern    Not on file   Social History Narrative    Not on file       Current Outpatient Medications   Medication Sig Dispense Refill    acetaminophen-codeine 300-30mg (TYLENOL #3) 300-30 mg Tab TAKE 1 TABLET EVERY DAY AS NEEDED FOR PAIN 30 tablet 3    albuterol (VENTOLIN HFA) 90 mcg/actuation inhaler Inhale 2 puffs into the lungs every 6 (six) hours as needed for Wheezing. Rescue      allopurinol (ZYLOPRIM) 300 MG tablet Take 300 mg by mouth once daily.      amLODIPine (NORVASC) 5 MG tablet TAKE 1 TABLET EVERY MORNING FOR BLOOD PRESSURE      arm brace (NEOPRENE WRIST SPLINT SUPPORT) Misc 1 each by Misc.(Non-Drug; Combo Route) route once daily. 2 each 0    aspirin (ECOTRIN) 325 MG EC tablet Take 325 mg by mouth once daily.      atorvastatin (LIPITOR) 40 MG tablet Take 40 mg by mouth every evening.      BD INSULIN SYRINGE ULTRA-FINE 1 mL 31 gauge x 5/16 Syrg       calcitRIOL (ROCALTROL) 0.5 MCG Cap Take 0.5 mcg by mouth once daily.      calcium carbonate (OS-ANGIE) 600 mg calcium (1,500 mg) Tab Take 600 mg by mouth once. Take one tablet at noon      clobetasol 0.05% (TEMOVATE) 0.05 % Oint Apply topically two times daily to extremities (elbows/knees) 60 g 3    clopidogrel (PLAVIX) 75 mg tablet Take 75 mg by mouth once daily.      cyanocobalamin 500 MCG tablet Take 500 mcg by mouth.      DULoxetine (CYMBALTA) 30 MG capsule Take 1 capsule (30 mg total) by mouth every evening. In the pm 30 capsule 12    ergocalciferol  "(ERGOCALCIFEROL) 50,000 unit Cap Take 50,000 Units by mouth.      febuxostat (ULORIC) 40 mg Tab       ferrous gluconate (FERGON) 324 MG tablet Take 324 mg by mouth.      fluocinonide (LIDEX) 0.05 % ointment AAA BID      furosemide (LASIX) 20 MG tablet Take 20 mg by mouth 2 (two) times daily.      gabapentin (NEURONTIN) 400 MG capsule Take 400 mg by mouth every evening.      insulin glargine (LANTUS) 100 unit/mL injection Inject 90 Units into the skin.      insulin NPH (NOVOLIN N NPH U-100 INSULIN) 100 unit/mL injection INJECT 35 UNITS SUBCUTANEOUSLY BEFORE BREAKFAST AND 35 UNITS SUBCUTANEOUSLY BEFORE LUNCH *THANK YOU*      insulin syringe-needle U-100 (BD INSULIN SYRINGE ULTRA-FINE) 1 mL 31 gauge x 5/16 Syrg USE AS DIRECTED TWICE DAILY *THANK YOU*      lancets (TRUEPLUS LANCETS) 28 gauge Misc USE AS DIRECTED 3 TIMES DAILY *THANK YOU*      levothyroxine (SYNTHROID) 25 MCG tablet Take 25 mcg by mouth before breakfast.      loratadine (CLARITIN) 10 mg tablet TAKE 1 TABLET AT BEDTIME FOR SINUS AND ALLERGIES *THANK YOU*      metoprolol tartrate (LOPRESSOR) 25 MG tablet Take 25 mg by mouth 2 (two) times daily.      multivitamin with minerals tablet Take 1 tablet by mouth once daily. Take 1 tablet at noon      nystatin (MYCOSTATIN) powder Apply twice daily until healing is complete 60 g 0    omeprazole (PRILOSEC) 20 MG capsule Take 20 mg by mouth once daily.      pen needle, diabetic (UNIFINE PENTIPS) 31 gauge x 5/16" Ndle USE AS DIRECTED DAILY WITH BASAGLAR *THANK YOU*      predniSONE (DELTASONE) 2.5 MG tablet Take 1 tablet (2.5 mg total) by mouth once daily. 90 tablet 3    secukinumab (COSENTYX PEN, 2 PENS,) 150 mg/mL PnIj Inject 300 mg into the skin every 30 days. 2 mL 11    triamcinolone acetonide 0.1% (KENALOG) 0.1 % cream Apply topically twice daily to affected areas under the breasts and under the abdomen 80 g 1     No current facility-administered medications for this visit.        Review of " patient's allergies indicates:   Allergen Reactions    Strawberry Hives    Sulfa (sulfonamide antibiotics) Swelling     Unknown^         Review of Systems   Constitution: Negative for chills and fever.   Cardiovascular: Positive for leg swelling. Negative for claudication.   Skin: Positive for color change and nail changes.   Musculoskeletal: Positive for arthritis, joint pain and joint swelling. Negative for muscle cramps and muscle weakness.   Neurological: Positive for numbness and paresthesias.   Psychiatric/Behavioral: Negative for altered mental status.           Objective:      Physical Exam   Constitutional: She is oriented to person, place, and time. She appears well-developed and well-nourished. No distress.   Cardiovascular:   Pulses:       Dorsalis pedis pulses are 1+ on the right side, and 1+ on the left side.        Posterior tibial pulses are 1+ on the right side, and 1+ on the left side.   CFT is 3 seconds on the Rt.  Pedal hair growth is absent bilateral.  Varicosities noted bilateral.  Moderate non pitting edema noted to bilateral lower extremity.  Toes are cool to touch on the Rt.   Musculoskeletal: She exhibits edema. She exhibits no tenderness.   Muscle strength 5/5 in all muscle groups bilateral.  No tenderness nor crepitation with ROM of foot/ankle joints bilateral.  No tenderness with palpation of bilateral foot and ankle.  Bilateral pes planus foot type.  Lt. TMA.  Rt. Hallux abducto valgus.  Rt. Semi-rigid contracture of toes 2-5.   Neurological: She is alert and oriented to person, place, and time. She has normal strength. A sensory deficit is present.   Protective sensation per Liberty-Radha monofilament is decreased bilateral.  Vibratory sensation is absent to the level of the malleoli bilateral.  Light touch is intact bilateral.   Skin: Skin is warm and dry. Capillary refill takes 2 to 3 seconds. Lesion noted. No abrasion, no bruising, no burn, no ecchymosis, no petechiae and no  rash noted. She is not diaphoretic. No erythema. No pallor.   Pedal skin appears edematous bilateral.  Toenails x 5 appear thickened by 2 mm's, elongated by 2 mm's, and discolored with subungual debris.  Hyperkeratotic lesion noted along the incision line of the Lt. TMA stump site.  Examination of the skin reveals no evidence of significant maceration, rashes, open lesions, suspicious appearing nevi or other concerning lesions.              Assessment:       Encounter Diagnoses   Name Primary?    Diabetic polyneuropathy associated with type 2 diabetes mellitus Yes    PAD (peripheral artery disease)     History of transmetatarsal amputation of left foot     Corn or callus     Hammer toe of right foot     Onychomycosis due to dermatophyte          Plan:       Priscilla was seen today for diabetes mellitus and diabetic foot exam.    Diagnoses and all orders for this visit:    Diabetic polyneuropathy associated with type 2 diabetes mellitus    PAD (peripheral artery disease)    History of transmetatarsal amputation of left foot    Corn or callus    Hammer toe of right foot    Onychomycosis due to dermatophyte      I counseled the patient on her conditions, their implications and medical management.    Advised to continue wearing shoe gear that accommodates foot deformities.    PAD remains stable with today's visit, will continue to monitor.    Shoe inspection. Diabetic Foot Education. Patient reminded of the importance of good nutrition and blood sugar control to help prevent podiatric complications of diabetes. Patient instructed on proper foot hygeine. We discussed wearing proper shoe gear, daily foot inspections, never walking without protective shoe gear, never putting sharp instruments to feet    With patient's permission, nails were aggressively reduced and debrided x 5 to their soft tissue attachment mechanically and with electric , removing all offending nail and debris.  Also, a sterile #15 scalpel was  used to trim the lesion x 1 down to smooth appearing skin without incident.  Patient relates relief following the procedure. She will continue to monitor the areas daily, inspect her feet, wear protective shoe gear when ambulatory, moisturizer to maintain skin integrity and follow in this office in approximately 2-3 months, sooner p.r.n.    Follow up in about 3 months (around 4/30/2020).    Tremaine Carter DPM

## 2020-02-06 ENCOUNTER — TELEPHONE (OUTPATIENT)
Dept: PHARMACY | Facility: CLINIC | Age: 80
End: 2020-02-06

## 2020-02-06 NOTE — TELEPHONE ENCOUNTER
Refill call regarding Cosentyx at [facility]. Will prepare for shipment on  to arrive  with consent of patient. Copay 8.95 AR. Patient has not reported any new allergies/ side effects. Pt taking medication(s) as directed with no questions or concerns for RPH. Also no new medications. and Address verified.  Patient stated that her next injection is set for 2/15 with 0 doses on hand. Sharps container was added with this fill.

## 2020-02-12 DIAGNOSIS — L40.50 PSA (PSORIATIC ARTHRITIS): ICD-10-CM

## 2020-02-12 DIAGNOSIS — L40.8 PSORIASIS WITH PUSTULES: ICD-10-CM

## 2020-02-12 NOTE — TELEPHONE ENCOUNTER
----- Message from Ronna Montgomery sent at 2020  3:20 PM CST -----  Regarding: Cosentyx  Rx for Cosentyx .  Can we get a new one sent over.   She was set for deliver on today's date

## 2020-02-12 NOTE — TELEPHONE ENCOUNTER
----- Message from Jonathan Garrison sent at 2020  8:32 AM CST -----  Contact: Ronna ([facility])  Type:  Pharmacy Calling to Clarify an RX    Name of Caller:  Ronna  Pharmacy Name:  [facility]  Prescription Name:  secukinumab (COSENTYX PEN, 2 PENS,) 150 mg/mL PnIj  What do they need to clarify?:  Prescription renewal, current has   Best Call Back Number:  [phone]  Additional Information:  NA

## 2020-02-12 NOTE — TELEPHONE ENCOUNTER
Called patient on  to inform that her Cosentyx prescription  on . Informed her that we are unable to fill her  prescription as originally planned on . Informed pt that [facility] was contacted today to request an urgent refill. Next dose due 2/15. Informed patient that we will call her to arrange shipment once refill is authorized. Patient voiced understanding. She had no further questions or concerns.

## 2020-02-13 NOTE — TELEPHONE ENCOUNTER
----- Message from Trish Pop sent at 2020 12:24 PM CST -----  Contact: [phone]/ [place] with [facility]   Ang[place] with [facility] would like to speak with you in regards to patient medication secukinumab (COSENTYX PEN, 2 PENS,) 150 mg/mL PnIj. She says that it is  and she needs a new script for it. She says this her third attempt trying to speak to someone and she needs an answer by 3pm today or she wont be able to fill it until Tuesday of next week which will result in patient missing the medication. Please call.

## 2020-02-13 NOTE — TELEPHONE ENCOUNTER
----- Message from Sommer Tesfaye sent at 2/13/2020 11:37 AM CST -----    Type:  Pharmacy Calling to Clarify an RX    Name of Caller:  [facility] [phone]  Prescription Name:  cosentyx  150 mg  What do they need to clarify?:    Refill   Additional Information:    Needed  By  This  Weekend    pharmacy  Asked  To tamica   High  priority

## 2020-03-10 ENCOUNTER — TELEPHONE (OUTPATIENT)
Dept: PHARMACY | Facility: CLINIC | Age: 80
End: 2020-03-10

## 2020-04-06 NOTE — TELEPHONE ENCOUNTER
Confirmed Cosentyx shipment  to arrive to patient home 4/10. Patient is taking as instructed by her physician and has no doses on hand. Patient stated her next dose due 4/15. She reported no new medications in the past month. Address and  verified. $0 copay (004).

## 2020-04-24 ENCOUNTER — TELEPHONE (OUTPATIENT)
Dept: RHEUMATOLOGY | Facility: CLINIC | Age: 80
End: 2020-04-24

## 2020-04-24 NOTE — TELEPHONE ENCOUNTER
Spoke with pt about switching appointment to virtual exam. Pt asked if she can be seen at a later date rather than virtual exam. Notified pt that someone from Dr. Farias's office will call her to get her rescheduled for another date. Pt agreed, and asked if I could send a message to [facility] staff about about shoulder pain. Pt was appreciative of call and help.

## 2020-04-24 NOTE — TELEPHONE ENCOUNTER
----- Message from Ruben Felton sent at 4/24/2020 10:46 AM CDT -----  Pt stated she is having some shoulder pain and would like a call back to address this issue.     Thanks!

## 2020-05-01 NOTE — TELEPHONE ENCOUNTER
----- Message from Zuleyma Gil sent at 5/1/2020  1:05 PM CDT -----  Type: Requesting Medication be ordered    Who Called:  Patient  Best Call Back Number: [phone]  Additional Information: Patient requesting doctor to order medication : Flexeril for neck and shoulder pain/please call back to advise.

## 2020-05-01 NOTE — TELEPHONE ENCOUNTER
Returned patient call regarding flexeril. Patient stated having severe pain in her neck going into her shoulders and down her arms. Nurse asked if she has ever taken a muscle relaxer before. Patient stated she remembers taking flexeril a long time ago. Nurse informed a request will be sent to Dr Farias. Patient verbalized understanding.

## 2020-05-04 ENCOUNTER — TELEPHONE (OUTPATIENT)
Dept: PHARMACY | Facility: CLINIC | Age: 80
End: 2020-05-04

## 2020-05-04 NOTE — TELEPHONE ENCOUNTER
Spoke with patient regarding Cosentyx refill. Patient confirmed need for refill, 0 doses on hand and next dose due Friday 5/15. Shipment set for  for patient to receive . Address and  verified. Patient denies any missed doses or side effects. No changes with other medications, conditions or allergies. Mentions some new onset shoulder pain, has been in touch with Dr. Farias's office. Reviewed non-pharm ways to manage as well. Patient had no other questions or concerns today, is aware to let [facility] know if anything comes up. $0 copay (004). New sharps container requested.

## 2020-05-05 RX ORDER — CYCLOBENZAPRINE HCL 5 MG
5 TABLET ORAL 3 TIMES DAILY PRN
Qty: 90 TABLET | Refills: 3 | Status: SHIPPED | OUTPATIENT
Start: 2020-05-05 | End: 2020-06-04

## 2020-05-14 ENCOUNTER — DOCUMENTATION ONLY (OUTPATIENT)
Dept: RHEUMATOLOGY | Facility: CLINIC | Age: 80
End: 2020-05-14

## 2020-05-15 ENCOUNTER — TELEPHONE (OUTPATIENT)
Dept: RHEUMATOLOGY | Facility: CLINIC | Age: 80
End: 2020-05-15

## 2020-05-15 NOTE — TELEPHONE ENCOUNTER
Yan Roca NP call regarding patient. Office was informed that patient is currently having inflammation in shoulder joint. Stated patient is currently taking oral medications for arthritis with no relief. Nurse informed office is currently doing virtual visits at this time. Will add patient to list for joint injections when office opens to patients. NP asked if office will call patient and inform. Attempted to contact patient regarding joint injection. Left a message informing office will contact once office opens to patients to schedule joint injection.

## 2020-05-15 NOTE — TELEPHONE ENCOUNTER
----- Message from Jonathan Garrison sent at 5/15/2020  8:40 AM CDT -----  Contact: Chanelle TALAMANTES (NP with [facility])  Type: Needs Medical Advice    Who Called:  Chanelle Weaver Call Back Number: [phone]  Additional Information: Caller states that the patient is experiencing right shoulder pain and would like to discuss scheduling an injection and follow up. Please call to advise. Thanks!

## 2020-05-25 NOTE — PROGRESS NOTES
[facility] Cyclobenzaprine 5 mg tab good until 12/31/2020. Pharmacy notified.

## 2020-06-05 ENCOUNTER — TELEPHONE (OUTPATIENT)
Dept: PHARMACY | Facility: CLINIC | Age: 80
End: 2020-06-05

## 2020-06-05 NOTE — TELEPHONE ENCOUNTER
Refill call regarding cosentyx at [facility]. Copay $0. Patient is in need of a refill, will ship  to arrive  with patient consent.Patient has not had any medication/ dose or instruction changes. No new allergies or side effects reported with this shipment. Medication is being taken as prescribed by physician and properly stored. Two patient identifiers:  and Address verified. Patient began taking meloxicam; no interactions per [facility]. Sharps container needed. Next injection due 6/15.

## 2020-07-06 ENCOUNTER — TELEPHONE (OUTPATIENT)
Dept: PHARMACY | Facility: CLINIC | Age: 80
End: 2020-07-06

## 2020-08-05 ENCOUNTER — TELEPHONE (OUTPATIENT)
Dept: PHARMACY | Facility: CLINIC | Age: 80
End: 2020-08-05

## 2020-10-07 NOTE — TELEPHONE ENCOUNTER
Confirmed Cosentyx shipment 10/13 to arrive to patient home 10/14. Patient is taking as instructed by her physician and has no doses on hand. Patient stated her next dose due 10/15. She reported no new medications in the past month. Address and  verified. $0 copay (004).

## 2020-11-11 ENCOUNTER — SPECIALTY PHARMACY (OUTPATIENT)
Dept: PHARMACY | Facility: CLINIC | Age: 80
End: 2020-11-11

## 2020-11-12 NOTE — TELEPHONE ENCOUNTER
Specialty Pharmacy - Refill Coordination    Specialty Medication Orders Linked to Encounter      Most Recent Value   Medication #1  secukinumab (COSENTYX PEN) 150 mg/mL PnIj (Order#[id number], Rx#[id number])          Refill Questions - Documented Responses      Most Recent Value   Relationship to patient of person spoken to?  Self   HIPAA/medical authority confirmed?  Yes   Any changes in contact preferences or allowed representatives?  No   Has the patient had any insurance changes?  No   Has the patient had any changes to specialty medication, dose, or instructions?  No   Has the patient started taking any new medications, herbals, or supplements?  No   Has the patient been diagnosed with any new medical conditions?  No   Does the patient have any new allergies to medications or foods?  No   Does the patient have any concerns about side effects?  No   Can the patient store medication/sharps container properly (at the correct temperature, away from children/pets, etc.)?  Yes   Can the patient call emergency services (911) in the event of an emergency?  Yes   Does the patient have any concerns or questions about taking or administering this medication as prescribed?  No   How many doses did the patient miss in the past 4 weeks or since the last fill?  0   How many doses does the patient have on hand?  0   How many days does the patient report on hand quantity will last?  5   Does the number of doses/days supply remaining match pharmacy expected amounts?  Yes   Does the patient feel that this medication is effective?  Yes   During the past 4 weeks, has patient missed any activities due to condition or medication?  No   During the past 4 weeks, did patient have any of the following urgent care visits?  None   How will the patient receive the medication?  Mail   When does the patient need to receive the medication?  11/15/20   Shipping Address  Prescription   Address in [facility] confirmed and updated if  neccessary?  Yes   Expected Copay ($)  0   Is the patient able to afford the medication copay?  Yes   Payment Method  zero copay   Days supply of Refill  30   Would patient like to speak to a pharmacist?  No   Do you want to trigger an intervention?  No   Do you want to trigger an additional referral task?  No   Refill activity completed?  Yes   Refill activity plan  Refill scheduled   Shipment/Pickup Date:  11/12/20          Current Outpatient Medications   Medication Sig    acetaminophen-codeine 300-30mg (TYLENOL #3) 300-30 mg Tab TAKE 1 TABLET EVERY DAY AS NEEDED FOR PAIN    albuterol (VENTOLIN HFA) 90 mcg/actuation inhaler Inhale 2 puffs into the lungs every 6 (six) hours as needed for Wheezing. Rescue    allopurinol (ZYLOPRIM) 300 MG tablet Take 300 mg by mouth once daily.    amLODIPine (NORVASC) 5 MG tablet TAKE 1 TABLET EVERY MORNING FOR BLOOD PRESSURE    arm brace (NEOPRENE WRIST SPLINT SUPPORT) Misc 1 each by Misc.(Non-Drug; Combo Route) route once daily.    aspirin (ECOTRIN) 325 MG EC tablet Take 325 mg by mouth once daily.    atorvastatin (LIPITOR) 40 MG tablet Take 40 mg by mouth every evening.    BD INSULIN SYRINGE ULTRA-FINE 1 mL 31 gauge x 5/16 Syrg     calcitRIOL (ROCALTROL) 0.5 MCG Cap Take 0.5 mcg by mouth once daily.    calcium carbonate (OS-ANGIE) 600 mg calcium (1,500 mg) Tab Take 600 mg by mouth once. Take one tablet at noon    clobetasol 0.05% (TEMOVATE) 0.05 % Oint Apply topically two times daily to extremities (elbows/knees)    clopidogrel (PLAVIX) 75 mg tablet Take 75 mg by mouth once daily.    cyanocobalamin 500 MCG tablet Take 500 mcg by mouth.    DULoxetine (CYMBALTA) 30 MG capsule Take 1 capsule (30 mg total) by mouth every evening. In the pm    ergocalciferol (ERGOCALCIFEROL) 50,000 unit Cap Take 50,000 Units by mouth.    febuxostat (ULORIC) 40 mg Tab     ferrous gluconate (FERGON) 324 MG tablet Take 324 mg by mouth.    fluocinonide (LIDEX) 0.05 % ointment AAA BID     "furosemide (LASIX) 20 MG tablet Take 20 mg by mouth 2 (two) times daily.    gabapentin (NEURONTIN) 400 MG capsule Take 400 mg by mouth every evening.    insulin glargine (LANTUS) 100 unit/mL injection Inject 90 Units into the skin.    insulin NPH (NOVOLIN N NPH U-100 INSULIN) 100 unit/mL injection INJECT 35 UNITS SUBCUTANEOUSLY BEFORE BREAKFAST AND 35 UNITS SUBCUTANEOUSLY BEFORE LUNCH *THANK YOU*    insulin syringe-needle U-100 (BD INSULIN SYRINGE ULTRA-FINE) 1 mL 31 gauge x 5/16 Syrg USE AS DIRECTED TWICE DAILY *THANK YOU*    lancets (TRUEPLUS LANCETS) 28 gauge Misc USE AS DIRECTED 3 TIMES DAILY *THANK YOU*    levothyroxine (SYNTHROID) 25 MCG tablet Take 25 mcg by mouth before breakfast.    loratadine (CLARITIN) 10 mg tablet TAKE 1 TABLET AT BEDTIME FOR SINUS AND ALLERGIES *THANK YOU*    metoprolol tartrate (LOPRESSOR) 25 MG tablet Take 25 mg by mouth 2 (two) times daily.    multivitamin with minerals tablet Take 1 tablet by mouth once daily. Take 1 tablet at noon    nystatin (MYCOSTATIN) powder Apply twice daily until healing is complete    omeprazole (PRILOSEC) 20 MG capsule Take 20 mg by mouth once daily.    pen needle, diabetic (UNIFINE PENTIPS) 31 gauge x 5/16" Ndle USE AS DIRECTED DAILY WITH BASAGLAR *THANK YOU*    predniSONE (DELTASONE) 2.5 MG tablet Take 1 tablet (2.5 mg total) by mouth once daily.    secukinumab (COSENTYX PEN) 150 mg/mL PnIj Inject 2 pens (300 mg) into the skin every 30 days.    triamcinolone acetonide 0.1% (KENALOG) 0.1 % cream Apply topically twice daily to affected areas under the breasts and under the abdomen   Last reviewed on 11/11/2020  6:56 PM by Maral Cowan    Review of patient's allergies indicates:   Allergen Reactions    Strawberry Hives    Sulfa (sulfonamide antibiotics) Swelling     Unknown^    Last reviewed on  11/11/2020 6:56 PM by Maral Cowan      Tasks added this encounter   No tasks added.   Tasks due within next 3 months   11/4/2020 - Refill " Call  12/16/2020 - Clinical - Follow Up Assesement (90 day)     OLGA ALANIZ  [facility]  [address] [address]  Phone: [phone]  Fax: [phone]

## 2020-12-17 ENCOUNTER — SPECIALTY PHARMACY (OUTPATIENT)
Dept: PHARMACY | Facility: CLINIC | Age: 80
End: 2020-12-17

## 2020-12-17 NOTE — TELEPHONE ENCOUNTER
Specialty Pharmacy - Refill Coordination    Specialty Medication Orders Linked to Encounter      Most Recent Value   Medication #1  secukinumab (COSENTYX PEN) 150 mg/mL PnIj (Order#[id number], Rx#[id number])          Refill Questions - Documented Responses      Most Recent Value   Relationship to patient of person spoken to?  Self   HIPAA/medical authority confirmed?  Yes   Any changes in contact preferences or allowed representatives?  No   Has the patient had any insurance changes?  No   Has the patient had any changes to specialty medication, dose, or instructions?  No   Has the patient started taking any new medications, herbals, or supplements?  No   Has the patient been diagnosed with any new medical conditions?  No   Does the patient have any new allergies to medications or foods?  No   Does the patient have any concerns about side effects?  No   Can the patient store medication/sharps container properly (at the correct temperature, away from children/pets, etc.)?  Yes   Can the patient call emergency services (911) in the event of an emergency?  Yes   Does the patient have any concerns or questions about taking or administering this medication as prescribed?  No   How many doses did the patient miss in the past 4 weeks or since the last fill?  0   How many doses does the patient have on hand?  0   How many days does the patient report on hand quantity will last?  0   Does the number of doses/days supply remaining match pharmacy expected amounts?  Yes   Does the patient feel that this medication is effective?  Yes   During the past 4 weeks, has patient missed any activities due to condition or medication?  No   During the past 4 weeks, did patient have any of the following urgent care visits?  None   How will the patient receive the medication?  Mail   When does the patient need to receive the medication?  12/15/20   Shipping Address  Home   Address in [facility] confirmed and updated if neccessary?   Yes   Expected Copay ($)  0   Is the patient able to afford the medication copay?  Yes   Payment Method  zero copay   Days supply of Refill  28   Would patient like to speak to a pharmacist?  No   Do you want to trigger an intervention?  No   Do you want to trigger an additional referral task?  No   Refill activity completed?  Yes   Refill activity plan  Refill scheduled   Shipment/Pickup Date:  12/17/20          Current Outpatient Medications   Medication Sig    acetaminophen-codeine 300-30mg (TYLENOL #3) 300-30 mg Tab TAKE 1 TABLET EVERY DAY AS NEEDED FOR PAIN    albuterol (VENTOLIN HFA) 90 mcg/actuation inhaler Inhale 2 puffs into the lungs every 6 (six) hours as needed for Wheezing. Rescue    allopurinol (ZYLOPRIM) 300 MG tablet Take 300 mg by mouth once daily.    amLODIPine (NORVASC) 5 MG tablet TAKE 1 TABLET EVERY MORNING FOR BLOOD PRESSURE    arm brace (NEOPRENE WRIST SPLINT SUPPORT) Misc 1 each by Misc.(Non-Drug; Combo Route) route once daily.    aspirin (ECOTRIN) 325 MG EC tablet Take 325 mg by mouth once daily.    atorvastatin (LIPITOR) 40 MG tablet Take 40 mg by mouth every evening.    BD INSULIN SYRINGE ULTRA-FINE 1 mL 31 gauge x 5/16 Syrg     calcitRIOL (ROCALTROL) 0.5 MCG Cap Take 0.5 mcg by mouth once daily.    calcium carbonate (OS-ANGIE) 600 mg calcium (1,500 mg) Tab Take 600 mg by mouth once. Take one tablet at noon    clobetasol 0.05% (TEMOVATE) 0.05 % Oint Apply topically two times daily to extremities (elbows/knees)    clopidogrel (PLAVIX) 75 mg tablet Take 75 mg by mouth once daily.    cyanocobalamin 500 MCG tablet Take 500 mcg by mouth.    DULoxetine (CYMBALTA) 30 MG capsule Take 1 capsule (30 mg total) by mouth every evening. In the pm    ergocalciferol (ERGOCALCIFEROL) 50,000 unit Cap Take 50,000 Units by mouth.    febuxostat (ULORIC) 40 mg Tab     ferrous gluconate (FERGON) 324 MG tablet Take 324 mg by mouth.    fluocinonide (LIDEX) 0.05 % ointment AAA BID    furosemide  "(LASIX) 20 MG tablet Take 20 mg by mouth 2 (two) times daily.    gabapentin (NEURONTIN) 400 MG capsule Take 400 mg by mouth every evening.    insulin glargine (LANTUS) 100 unit/mL injection Inject 90 Units into the skin.    insulin NPH (NOVOLIN N NPH U-100 INSULIN) 100 unit/mL injection INJECT 35 UNITS SUBCUTANEOUSLY BEFORE BREAKFAST AND 35 UNITS SUBCUTANEOUSLY BEFORE LUNCH *THANK YOU*    insulin syringe-needle U-100 (BD INSULIN SYRINGE ULTRA-FINE) 1 mL 31 gauge x 5/16 Syrg USE AS DIRECTED TWICE DAILY *THANK YOU*    lancets (TRUEPLUS LANCETS) 28 gauge Misc USE AS DIRECTED 3 TIMES DAILY *THANK YOU*    levothyroxine (SYNTHROID) 25 MCG tablet Take 25 mcg by mouth before breakfast.    loratadine (CLARITIN) 10 mg tablet TAKE 1 TABLET AT BEDTIME FOR SINUS AND ALLERGIES *THANK YOU*    metoprolol tartrate (LOPRESSOR) 25 MG tablet Take 25 mg by mouth 2 (two) times daily.    multivitamin with minerals tablet Take 1 tablet by mouth once daily. Take 1 tablet at noon    nystatin (MYCOSTATIN) powder Apply twice daily until healing is complete    omeprazole (PRILOSEC) 20 MG capsule Take 20 mg by mouth once daily.    pen needle, diabetic (UNIFINE PENTIPS) 31 gauge x 5/16" Ndle USE AS DIRECTED DAILY WITH BASAGLAR *THANK YOU*    predniSONE (DELTASONE) 2.5 MG tablet Take 1 tablet (2.5 mg total) by mouth once daily.    secukinumab (COSENTYX PEN) 150 mg/mL PnIj Inject 2 pens (300 mg) into the skin every 30 days.    triamcinolone acetonide 0.1% (KENALOG) 0.1 % cream Apply topically twice daily to affected areas under the breasts and under the abdomen   Last reviewed on 11/11/2020  6:56 PM by Maral Cowan    Review of patient's allergies indicates:   Allergen Reactions    Strawberry Hives    Sulfa (sulfonamide antibiotics) Swelling     Unknown^    Last reviewed on  11/11/2020 6:56 PM by Maral Cowan      Tasks added this encounter   No tasks added.   Tasks due within next 3 months   12/4/2020 - Refill Call (Auto " Added)     Vlaerie Rao  [facility]  [address] [facility] [address]  Phone: [phone]  Fax: [phone]

## 2021-01-11 DIAGNOSIS — L40.50 PSA (PSORIATIC ARTHRITIS): ICD-10-CM

## 2021-01-11 DIAGNOSIS — G89.4 CHRONIC PAIN SYNDROME: ICD-10-CM

## 2021-01-11 RX ORDER — ACETAMINOPHEN AND CODEINE PHOSPHATE 300; 30 MG/1; MG/1
TABLET ORAL
Qty: 30 TABLET | OUTPATIENT
Start: 2021-01-11

## 2021-01-12 ENCOUNTER — SPECIALTY PHARMACY (OUTPATIENT)
Dept: PHARMACY | Facility: CLINIC | Age: 81
End: 2021-01-12

## 2021-02-09 RX ORDER — SECUKINUMAB 150 MG/ML
300 INJECTION SUBCUTANEOUS
Qty: 2 ML | Refills: 11 | OUTPATIENT
Start: 2021-02-09

## 2021-02-10 ENCOUNTER — SPECIALTY PHARMACY (OUTPATIENT)
Dept: PHARMACY | Facility: CLINIC | Age: 81
End: 2021-02-10

## 2021-02-22 ENCOUNTER — TELEPHONE (OUTPATIENT)
Dept: RHEUMATOLOGY | Facility: CLINIC | Age: 81
End: 2021-02-22